# Patient Record
Sex: FEMALE | Race: WHITE | NOT HISPANIC OR LATINO | Employment: OTHER | URBAN - NONMETROPOLITAN AREA
[De-identification: names, ages, dates, MRNs, and addresses within clinical notes are randomized per-mention and may not be internally consistent; named-entity substitution may affect disease eponyms.]

---

## 2024-02-22 ENCOUNTER — TRANSCRIBE ORDERS (OUTPATIENT)
Dept: ADMINISTRATIVE | Facility: HOSPITAL | Age: 79
End: 2024-02-22
Payer: MEDICARE

## 2024-02-22 DIAGNOSIS — R01.1 HEART MURMUR: Primary | ICD-10-CM

## 2024-03-12 ENCOUNTER — HOSPITAL ENCOUNTER (OUTPATIENT)
Dept: CARDIOLOGY | Facility: HOSPITAL | Age: 79
Discharge: HOME | End: 2024-03-12
Admitting: INTERNAL MEDICINE
Payer: MEDICARE

## 2024-03-12 VITALS
HEIGHT: 66 IN | WEIGHT: 149 LBS | BODY MASS INDEX: 23.95 KG/M2 | SYSTOLIC BLOOD PRESSURE: 146 MMHG | DIASTOLIC BLOOD PRESSURE: 52 MMHG

## 2024-03-12 DIAGNOSIS — R01.1 HEART MURMUR: ICD-10-CM

## 2024-03-12 PROCEDURE — 25510000001 PERFLUTREN 6.52 MG/ML SUSPENSION: Performed by: INTERNAL MEDICINE

## 2024-03-12 PROCEDURE — 93306 TTE W/DOPPLER COMPLETE: CPT | Performed by: EMERGENCY MEDICINE

## 2024-03-12 PROCEDURE — 93306 TTE W/DOPPLER COMPLETE: CPT

## 2024-03-12 RX ADMIN — PERFLUTREN 9.78 MG: 6.52 INJECTION, SUSPENSION INTRAVENOUS at 16:12

## 2024-03-13 LAB
BH CV ECHO MEAS - AO MAX PG: 8.1 MMHG
BH CV ECHO MEAS - AO MEAN PG: 5 MMHG
BH CV ECHO MEAS - AO ROOT DIAM: 2.9 CM
BH CV ECHO MEAS - AO V2 MAX: 142 CM/SEC
BH CV ECHO MEAS - AO V2 VTI: 27.8 CM
BH CV ECHO MEAS - AVA(I,D): 2.26 CM2
BH CV ECHO MEAS - EDV(CUBED): 110.6 ML
BH CV ECHO MEAS - EDV(MOD-SP2): 148 ML
BH CV ECHO MEAS - EDV(MOD-SP4): 120 ML
BH CV ECHO MEAS - EF(MOD-BP): 40.5 %
BH CV ECHO MEAS - EF(MOD-SP2): 44.2 %
BH CV ECHO MEAS - EF(MOD-SP4): 36.3 %
BH CV ECHO MEAS - ESV(CUBED): 59.3 ML
BH CV ECHO MEAS - ESV(MOD-SP2): 82.6 ML
BH CV ECHO MEAS - ESV(MOD-SP4): 76.4 ML
BH CV ECHO MEAS - FS: 18.8 %
BH CV ECHO MEAS - IVS/LVPW: 1.09 CM
BH CV ECHO MEAS - IVSD: 1.2 CM
BH CV ECHO MEAS - LA DIMENSION: 4.2 CM
BH CV ECHO MEAS - LAT PEAK E' VEL: 9.1 CM/SEC
BH CV ECHO MEAS - LV DIASTOLIC VOL/BSA (35-75): 68 CM2
BH CV ECHO MEAS - LV MASS(C)D: 206.4 GRAMS
BH CV ECHO MEAS - LV MAX PG: 3.1 MMHG
BH CV ECHO MEAS - LV MEAN PG: 2 MMHG
BH CV ECHO MEAS - LV SYSTOLIC VOL/BSA (12-30): 43.3 CM2
BH CV ECHO MEAS - LV V1 MAX: 88.6 CM/SEC
BH CV ECHO MEAS - LV V1 VTI: 18.1 CM
BH CV ECHO MEAS - LVIDD: 4.8 CM
BH CV ECHO MEAS - LVIDS: 3.9 CM
BH CV ECHO MEAS - LVOT AREA: 3.5 CM2
BH CV ECHO MEAS - LVOT DIAM: 2.1 CM
BH CV ECHO MEAS - LVPWD: 1.1 CM
BH CV ECHO MEAS - MED PEAK E' VEL: 4.5 CM/SEC
BH CV ECHO MEAS - MR MAX PG: 114.1 MMHG
BH CV ECHO MEAS - MR MAX VEL: 534 CM/SEC
BH CV ECHO MEAS - MR MEAN PG: 69 MMHG
BH CV ECHO MEAS - MR MEAN VEL: 389 CM/SEC
BH CV ECHO MEAS - MR VTI: 186.5 CM
BH CV ECHO MEAS - MV A MAX VEL: 87 CM/SEC
BH CV ECHO MEAS - MV DEC TIME: 0.06 SEC
BH CV ECHO MEAS - MV E MAX VEL: 131 CM/SEC
BH CV ECHO MEAS - MV E/A: 1.51
BH CV ECHO MEAS - RAP SYSTOLE: 5 MMHG
BH CV ECHO MEAS - RVSP: 20.7 MMHG
BH CV ECHO MEAS - SI(MOD-SP2): 37.1 ML/M2
BH CV ECHO MEAS - SI(MOD-SP4): 24.7 ML/M2
BH CV ECHO MEAS - SV(LVOT): 62.7 ML
BH CV ECHO MEAS - SV(MOD-SP2): 65.4 ML
BH CV ECHO MEAS - SV(MOD-SP4): 43.6 ML
BH CV ECHO MEAS - TR MAX PG: 15.7 MMHG
BH CV ECHO MEAS - TR MAX VEL: 198 CM/SEC
BH CV ECHO MEASUREMENTS AVERAGE E/E' RATIO: 19.26
BH CV XLRA - RV BASE: 2.5 CM
BH CV XLRA - RV LENGTH: 5.7 CM
BH CV XLRA - RV MID: 2 CM
LEFT ATRIUM VOLUME INDEX: 34.3 ML/M2
LEFT ATRIUM VOLUME: 60.4 ML

## 2024-03-25 ENCOUNTER — TELEPHONE (OUTPATIENT)
Dept: INTERNAL MEDICINE | Facility: CLINIC | Age: 79
End: 2024-03-25
Payer: MEDICARE

## 2024-03-25 NOTE — TELEPHONE ENCOUNTER
CALLED PT, SON,  AND PT DAUGHTER TO RESCHEDULE APPT AND LVM ON DAUGHTER'S AND SON'S PHONE TO CALL US BACK

## 2024-04-04 ENCOUNTER — PATIENT ROUNDING (BHMG ONLY) (OUTPATIENT)
Dept: INTERNAL MEDICINE | Facility: CLINIC | Age: 79
End: 2024-04-04

## 2024-04-04 ENCOUNTER — OFFICE VISIT (OUTPATIENT)
Dept: INTERNAL MEDICINE | Facility: CLINIC | Age: 79
End: 2024-04-04
Payer: MEDICARE

## 2024-04-04 VITALS
OXYGEN SATURATION: 99 % | SYSTOLIC BLOOD PRESSURE: 155 MMHG | RESPIRATION RATE: 16 BRPM | TEMPERATURE: 98.5 F | WEIGHT: 148.2 LBS | HEIGHT: 66 IN | HEART RATE: 86 BPM | DIASTOLIC BLOOD PRESSURE: 74 MMHG | BODY MASS INDEX: 23.82 KG/M2

## 2024-04-04 DIAGNOSIS — L84 PRE-ULCERATIVE CORN OR CALLOUS: Primary | ICD-10-CM

## 2024-04-04 PROCEDURE — 1160F RVW MEDS BY RX/DR IN RCRD: CPT | Performed by: NURSE PRACTITIONER

## 2024-04-04 PROCEDURE — 1159F MED LIST DOCD IN RCRD: CPT | Performed by: NURSE PRACTITIONER

## 2024-04-04 PROCEDURE — 99203 OFFICE O/P NEW LOW 30 MIN: CPT | Performed by: NURSE PRACTITIONER

## 2024-04-04 RX ORDER — ASPIRIN 81 MG/1
TABLET ORAL
COMMUNITY

## 2024-04-04 RX ORDER — ATORVASTATIN CALCIUM 40 MG/1
40 TABLET, FILM COATED ORAL
COMMUNITY

## 2024-04-04 RX ORDER — DOXYCYCLINE HYCLATE 100 MG/1
100 CAPSULE ORAL 2 TIMES DAILY
Qty: 20 CAPSULE | Refills: 0 | Status: SHIPPED | OUTPATIENT
Start: 2024-04-04

## 2024-04-04 RX ORDER — ESTRADIOL 0.1 MG/G
CREAM VAGINAL
COMMUNITY

## 2024-04-04 RX ORDER — LOSARTAN POTASSIUM AND HYDROCHLOROTHIAZIDE 25; 100 MG/1; MG/1
1 TABLET ORAL DAILY
COMMUNITY

## 2024-04-04 RX ORDER — SACUBITRIL AND VALSARTAN 49; 51 MG/1; MG/1
TABLET, FILM COATED ORAL
COMMUNITY
Start: 2024-04-02

## 2024-04-04 RX ORDER — MELOXICAM 7.5 MG/1
1 TABLET ORAL DAILY
COMMUNITY

## 2024-04-04 RX ORDER — SPIRONOLACTONE 50 MG/1
1 TABLET, FILM COATED ORAL DAILY
COMMUNITY

## 2024-04-04 RX ORDER — INSULIN GLARGINE-YFGN 100 [IU]/ML
INJECTION, SOLUTION SUBCUTANEOUS
COMMUNITY

## 2024-04-04 RX ORDER — INSULIN ASPART 100 [IU]/ML
INJECTION, SOLUTION INTRAVENOUS; SUBCUTANEOUS
COMMUNITY

## 2024-04-04 NOTE — PROGRESS NOTES
April 4, 2024    Hello, may I speak with Yvrose Ingram?    My name is MARGARITO      I am  with [facility] [place]  [facility]  [address] [place] [address] [phone].    Before we get started may I verify your date of birth? 1945    I am calling to officially welcome you to our practice and ask about your recent visit. Is this a good time to talk? yes    Tell me about your visit with us. What things went well?  pt really liked Grace.  Pt stated she's so comfortable to talk to and funny.  The whole office is great, everyone is so nice.       We're always looking for ways to make our patients' experiences even better. Do you have recommendations on ways we may improve?  no    Overall were you satisfied with your first visit to our practice? yes       I appreciate you taking the time to speak with me today. Is there anything else I can do for you? no      Thank you, and have a great day.

## 2024-04-04 NOTE — PROGRESS NOTES
Subjective     Chief Complaint   Patient presents with    Skin Lesion     Left foot. Injury in November.        History of Present Illness  Patient comes in today as a new patient to me.  She is a previous provider of Dr. Rosa Maria Olivo.  Patient comes in today with complaints of a skin lesion on her left lateral foot.  Onset was lesion was December she noticed a black dot some skin came off she has been placing A&E and Polysporin on it.  Patient does carry history of diabetes, heart failure, and hypertension.  Last laboratory data was obtained in the end of February.  She tells me her last A1c was 6.9.  I do not have records at this time to corroborate.  The lesion does appear to have a suture.  She states that it was a black dot.  She states that she has soaked it and has used A& D ointment.     Review of Systems   Otherwise complete ROS reviewed and negative except as mentioned in the HPI.    Past Medical History:   Past Medical History:   Diagnosis Date    CHF (congestive heart failure)     Diabetes mellitus     Hyperlipidemia     Hypertension      Past Surgical History:History reviewed. No pertinent surgical history.  Social History:  reports that she has never smoked. She has never used smokeless tobacco. She reports that she does not drink alcohol and does not use drugs.    Family History: family history includes No Known Problems in her father and mother.       Allergies:  Allergies   Allergen Reactions    Penicillins Rash     Medications:  Prior to Admission medications    Medication Sig Start Date End Date Taking? Authorizing Provider   aspirin (Aspirin Adult Low Dose) 81 MG EC tablet Take 1 tablet every 3 weeks by oral route.   Yes Akash Rendon MD   atorvastatin (LIPITOR) 40 MG tablet Take 1 tablet by mouth every night at bedtime.   Yes Akash Rendon MD   Entresto 49-51 MG tablet  4/2/24  Yes Akash Rendon MD   estradiol (ESTRACE) 0.1 MG/GM vaginal cream INSERT 0.5 GRAMS  "3 TIMES A WEEK BY VAGINAL ROUTE AT BEDTIME.   Yes Akash Rendon MD   insulin aspart (novoLOG) 100 UNIT/ML injection PLEASE SEE ATTACHED FOR DETAILED DIRECTIONS   Yes Akash Rendon MD   losartan-hydrochlorothiazide (HYZAAR) 100-25 MG per tablet Take 1 tablet by mouth Daily.   Yes Akash Rendon MD   meloxicam (MOBIC) 7.5 MG tablet Take 1 tablet by mouth Daily.   Yes Akash Rendon MD   metFORMIN (GLUCOPHAGE) 1000 MG tablet Take 1 tablet by mouth 2 (Two) Times a Day.   Yes Provider, Historical, MD   Semglee, yfgn, 100 UNIT/ML solution pen-injector INJECT 18 UNITS EVERY MORNING   Yes Akash Rendon MD   spironolactone (ALDACTONE) 50 MG tablet Take 1 tablet by mouth Daily.   Yes Akash Rendon MD       Objective     Vital Signs: /74 (BP Location: Left arm, Patient Position: Sitting, Cuff Size: Adult)   Pulse 86   Temp 98.5 °F (36.9 °C) (Skin)   Resp 16   Ht 167.6 cm (65.98\")   Wt 67.2 kg (148 lb 3.2 oz)   SpO2 99%   BMI 23.93 kg/m²   Physical Exam  Vitals reviewed.   Constitutional:       Appearance: Normal appearance. She is well-developed.   HENT:      Head: Normocephalic and atraumatic.   Eyes:      Pupils: Pupils are equal, round, and reactive to light.   Neck:      Vascular: No JVD.   Cardiovascular:      Rate and Rhythm: Normal rate and regular rhythm.   Pulmonary:      Effort: Pulmonary effort is normal.      Breath sounds: Normal breath sounds.   Abdominal:      General: Bowel sounds are normal.      Palpations: Abdomen is soft.   Musculoskeletal:         General: No deformity.      Cervical back: Normal range of motion and neck supple.      Right lower leg: Edema present.      Left lower leg: Edema present.   Lymphadenopathy:      Cervical: No cervical adenopathy.   Skin:     General: Skin is warm and dry.      Comments: Callous with dark center left lateral edge. I shaved off some layers without any bleeding noted with #10 blade. Pt tolerated.  " "  Neurological:      Mental Status: She is alert and oriented to person, place, and time.   Psychiatric:         Behavior: Behavior normal.         Thought Content: Thought content normal.         Judgment: Judgment normal.         BMI is within normal parameters. No other follow-up for BMI required.      Results Reviewed:  No results found for: \"GLUCOSE\", \"BUN\", \"CREATININE\", \"NA\", \"K\", \"CL\", \"CO2\", \"CALCIUM\", \"ALT\", \"AST\", \"WBC\", \"HCT\", \"PLT\", \"CHOL\", \"TRIG\", \"HDL\", \"LDL\", \"LDLHDL\", \"HGBA1C\"      Assessment / Plan     Assessment/Plan:  Diagnoses and all orders for this visit:    1. Pre-ulcerative corn or callous (Primary)  -     mupirocin (BACTROBAN) 2 % ointment; Apply 1 Application topically to the appropriate area as directed 2 (Two) Times a Day.  Dispense: 22 g; Refill: 1  -     doxycycline (VIBRAMYCIN) 100 MG capsule; Take 1 capsule by mouth 2 (Two) Times a Day.  Dispense: 20 capsule; Refill: 0        Return in about 3 weeks (around 4/25/2024). unless patient needs to be seen sooner or acute issues arise.    Code Status: Full.     I have discussed the patient results/orders and and plan/recommendation with them at today's visit.      Signed by:    KARLA Roth Date: 04/04/24  "

## 2024-04-10 ENCOUNTER — NURSE TRIAGE (OUTPATIENT)
Dept: CALL CENTER | Facility: HOSPITAL | Age: 79
End: 2024-04-10
Payer: MEDICARE

## 2024-04-10 NOTE — TELEPHONE ENCOUNTER
"Reason for Disposition   Diastolic BP < 50 mm Hg    Additional Information   Negative: Started suddenly after an allergic medicine, an allergic food, or bee sting   Negative: Shock suspected (e.g., cold/pale/clammy skin, too weak to stand, low BP, rapid pulse)   Negative: Difficult to awaken or acting confused (e.g., disoriented, slurred speech)   Negative: Fainted   Negative: [1] Systolic BP < 90 AND [2] dizzy, lightheaded, or weak   Negative: Chest pain   Negative: Bleeding (e.g., vomiting blood, rectal bleeding or tarry stools, severe vaginal bleeding)(Exception: Fainted from sight of small amount of blood; small cut or abrasion.)   Negative: Extra heartbeats, irregular heart beating, or heart is beating very fast  (i.e., \"palpitations\")   Negative: Sounds like a life-threatening emergency to the triager   Negative: [1] Systolic BP < 80 AND [2] NOT dizzy, lightheaded or weak   Negative: Abdominal pain   Negative: Fever > 100.4 F (38.0 C)   Negative: Major surgery in the past month   Negative: [1] Drinking very little AND [2] dehydration suspected (e.g., no urine > 12 hours, very dry mouth, very lightheaded)   Negative: [1] Fall in systolic BP > 20 mm Hg from normal AND [2] dizzy, lightheaded, or weak   Negative: Patient sounds very sick or weak to the triager   Negative: [1] Systolic BP < 90 AND [2] NOT dizzy, lightheaded or weak   Negative: [1] Systolic BP  AND [2] taking blood pressure medications AND [3] dizzy, lightheaded or weak   Negative: [1] Systolic BP  AND [2] taking blood pressure medications AND [3] NOT dizzy, lightheaded or weak   Negative: [1] Fall in systolic BP > 20 mm Hg from normal AND [2] NOT dizzy, lightheaded, or weak   Negative: Fall in systolic BP > 20 mmHg after standing up   Negative: Fall in systolic BP > 20 mmHg after eating a meal    Answer Assessment - Initial Assessment Questions  1. BLOOD PRESSURE: \"What is the blood pressure?\" \"Did you take at least two measurements 5 " "minutes apart?\"      111/47 then 110/49  2. ONSET: \"When did you take your blood pressure?\"      4 and 415  3. HOW: \"How did you obtain the blood pressure?\" (e.g., visiting nurse, automatic home BP monitor)      Automatic cuff  4. HISTORY: \"Do you have a history of low blood pressure?\" \"What is your blood pressure normally?\"      denies  5. MEDICINES: \"Are you taking any medications for blood pressure?\" If Yes, ask: \"Have they been changed recently?\"      Started entresto on 4/8,   6. PULSE RATE: \"Do you know what your pulse rate is?\"       97 and 93  7. OTHER SYMPTOMS: \"Have you been sick recently?\" \"Have you had a recent injury?\"      denies  8. PREGNANCY: \"Is there any chance you are pregnant?\" \"When was your last menstrual period?\"      na    Protocols used: Blood Pressure - Low-ADULT-AH    "

## 2024-04-11 NOTE — TELEPHONE ENCOUNTER
I called patient and she stated that her PCP started her on it. I asked who that was and she stated Rosa Maria Olivo. I informed patient that she would need to call that office and patient stated she was going to as soon as they opened. I updated patient PCP.

## 2024-04-11 NOTE — TELEPHONE ENCOUNTER
Called patient back and she stated that Dr. Oilvo told her to cut spirolactone in half to 25 mg. Dr. Olivo gave patient a referral to cardiology to see Dr. Quiñones at [facility] this week. She is going to have Dr. Quiñones send all info to you so that she can see you as PCP.

## 2024-04-25 ENCOUNTER — OFFICE VISIT (OUTPATIENT)
Dept: INTERNAL MEDICINE | Facility: CLINIC | Age: 79
End: 2024-04-25
Payer: MEDICARE

## 2024-04-25 VITALS
TEMPERATURE: 97.5 F | OXYGEN SATURATION: 100 % | WEIGHT: 147 LBS | DIASTOLIC BLOOD PRESSURE: 70 MMHG | HEART RATE: 87 BPM | SYSTOLIC BLOOD PRESSURE: 129 MMHG | HEIGHT: 66 IN | RESPIRATION RATE: 16 BRPM | BODY MASS INDEX: 23.63 KG/M2

## 2024-04-25 DIAGNOSIS — E11.69 TYPE 2 DIABETES MELLITUS WITH OTHER SPECIFIED COMPLICATION, WITH LONG-TERM CURRENT USE OF INSULIN: Primary | ICD-10-CM

## 2024-04-25 DIAGNOSIS — E78.5 HYPERLIPIDEMIA, UNSPECIFIED HYPERLIPIDEMIA TYPE: ICD-10-CM

## 2024-04-25 DIAGNOSIS — Z79.4 TYPE 2 DIABETES MELLITUS WITH OTHER SPECIFIED COMPLICATION, WITH LONG-TERM CURRENT USE OF INSULIN: Primary | ICD-10-CM

## 2024-04-25 DIAGNOSIS — Z11.59 ENCOUNTER FOR HEPATITIS C SCREENING TEST FOR LOW RISK PATIENT: ICD-10-CM

## 2024-04-25 DIAGNOSIS — I10 PRIMARY HYPERTENSION: ICD-10-CM

## 2024-04-25 DIAGNOSIS — L84 PRE-ULCERATIVE CORN OR CALLOUS: ICD-10-CM

## 2024-04-25 PROCEDURE — G2211 COMPLEX E/M VISIT ADD ON: HCPCS | Performed by: NURSE PRACTITIONER

## 2024-04-25 PROCEDURE — 99214 OFFICE O/P EST MOD 30 MIN: CPT | Performed by: NURSE PRACTITIONER

## 2024-04-25 NOTE — PROGRESS NOTES
Subjective     Chief Complaint   Patient presents with    Callouses       History of Present Illness    The patient presents for evaluation of multiple medical concerns.    The patient has a longstanding history of foot swelling, which has recently intensified. Despite the swelling, her respiratory function remains unaffected, with no reported cough or dyspnea. Her last consultation with her cardiologist, Dr. Quiñones, was on 04/16/2024. A nuclear stress test is scheduled for approximately one week from now. The patient spends a significant amount of time seated, which she believes contributes to her foot swelling. She has attempted to use compression socks, but notes they are difficult to put on.     The patient's foot condition has improved, with a reduction in redness and a detached piece of skin two days prior. She continues to apply ointment to the affected area. Today, she has for the first time been able to wear shoes due to persistent soreness. An x-ray was conducted after an incident where she stepped on a , which did not reveal any abnormalities, although a bone spur was mentioned. She recently underwent toenail trimming.     Her current medication regimen includes half a tablet of Entresto twice daily and half a tablet of meloxicam. She has discontinued the use of spironolactone and losartan. She still experiences edema however. No orthopnea.       Review of Systems   Constitutional:  Negative for activity change, appetite change, chills and fever.   HENT:  Negative for hearing loss, nosebleeds, tinnitus and trouble swallowing.    Eyes:  Negative for visual disturbance.   Respiratory:  Negative for cough, chest tightness, shortness of breath and wheezing.    Cardiovascular:  Positive for leg swelling. Negative for chest pain and palpitations.   Gastrointestinal:  Negative for abdominal distention, abdominal pain, blood in stool, constipation, diarrhea, nausea and vomiting.   Endocrine: Negative  for cold intolerance, heat intolerance, polydipsia, polyphagia and polyuria.   Genitourinary:  Negative for decreased urine volume, difficulty urinating, dysuria, flank pain, frequency and hematuria.   Musculoskeletal:  Negative for arthralgias, joint swelling and myalgias.   Skin:  Negative for rash.   Allergic/Immunologic: Negative for immunocompromised state.   Neurological:  Negative for dizziness, syncope, weakness, light-headedness and headaches.   Hematological:  Negative for adenopathy. Does not bruise/bleed easily.   Psychiatric/Behavioral:  Negative for confusion and sleep disturbance. The patient is not nervous/anxious.         Otherwise complete ROS reviewed and negative except as mentioned in the HPI.    Past Medical History:   Past Medical History:   Diagnosis Date    CHF (congestive heart failure)     Diabetes mellitus     Hyperlipidemia     Hypertension      Past Surgical History:No past surgical history on file.  Social History:  reports that she has never smoked. She has never used smokeless tobacco. She reports that she does not drink alcohol and does not use drugs.    Family History: family history includes No Known Problems in her father and mother.       Allergies:  Allergies   Allergen Reactions    Penicillins Rash     Medications:  Prior to Admission medications    Medication Sig Start Date End Date Taking? Authorizing Provider   aspirin (Aspirin Adult Low Dose) 81 MG EC tablet Take 1 tablet every 3 weeks by oral route.    Akash Rendon MD   atorvastatin (LIPITOR) 40 MG tablet Take 1 tablet by mouth every night at bedtime.    Akash Rendon MD   doxycycline (VIBRAMYCIN) 100 MG capsule Take 1 capsule by mouth 2 (Two) Times a Day. 4/4/24   Zuleyka Sagastume APRN   Entresto 49-51 MG tablet  4/2/24   Akash Rendon MD   estradiol (ESTRACE) 0.1 MG/GM vaginal cream INSERT 0.5 GRAMS 3 TIMES A WEEK BY VAGINAL ROUTE AT BEDTIME.    Akash Rendon MD   insulin aspart  "(novoLOG) 100 UNIT/ML injection PLEASE SEE ATTACHED FOR DETAILED DIRECTIONS    Akash Rendon MD   losartan-hydrochlorothiazide (HYZAAR) 100-25 MG per tablet Take 1 tablet by mouth Daily.    Akash Rendon MD   meloxicam (MOBIC) 7.5 MG tablet Take 1 tablet by mouth Daily.    Akash Rendon MD   metFORMIN (GLUCOPHAGE) 1000 MG tablet Take 1 tablet by mouth 2 (Two) Times a Day.    Akash Rendon MD   mupirocin (BACTROBAN) 2 % ointment Apply 1 Application topically to the appropriate area as directed 2 (Two) Times a Day. 4/4/24   Zuleyka Sagastume, APRN   Semglee, yfgn, 100 UNIT/ML solution pen-injector INJECT 18 UNITS EVERY MORNING    Akash Rendon MD   spironolactone (ALDACTONE) 50 MG tablet Take 1 tablet by mouth Daily.    Akash Rendon MD       Objective     Vital Signs: /70   Pulse 87   Temp 97.5 °F (36.4 °C)   Resp 16   Ht 167.6 cm (65.98\")   Wt 66.7 kg (147 lb)   SpO2 100%   BMI 23.74 kg/m²   Physical Exam  HENT:      Head: Normocephalic and atraumatic.   Eyes:      Conjunctiva/sclera: Conjunctivae normal.      Pupils: Pupils are equal, round, and reactive to light.   Cardiovascular:      Rate and Rhythm: Normal rate and regular rhythm.      Heart sounds: Normal heart sounds.   Pulmonary:      Effort: Pulmonary effort is normal. No respiratory distress.      Comments: Diminished in bases bilaterally. No crackles.   Abdominal:      General: Bowel sounds are normal. There is no distension.      Palpations: Abdomen is soft.      Tenderness: There is no abdominal tenderness.   Musculoskeletal:         General: Swelling (1-2+pitting edema.) present.      Cervical back: Neck supple.   Skin:     General: Skin is warm and dry.      Findings: No rash.      Comments: Foot with softened callous, but remains tender.    Neurological:      General: No focal deficit present.      Mental Status: She is alert and oriented to person, place, and time.   Psychiatric:    " "     Mood and Affect: Mood normal.         Behavior: Behavior normal.         Thought Content: Thought content normal.         Judgment: Judgment normal.     BMI is within normal parameters. No other follow-up for BMI required.      Results Reviewed:  No results found for: \"GLUCOSE\", \"BUN\", \"CREATININE\", \"NA\", \"K\", \"CL\", \"CO2\", \"CALCIUM\", \"ALT\", \"AST\", \"WBC\", \"HCT\", \"PLT\", \"CHOL\", \"TRIG\", \"HDL\", \"LDL\", \"LDLHDL\", \"HGBA1C\"      Assessment / Plan     Assessment/Plan:  Diagnoses and all orders for this visit:    1. Type 2 diabetes mellitus with other specified complication, with long-term current use of insulin (Primary)  -     CBC & Differential  -     Comprehensive Metabolic Panel  -     Hemoglobin A1c  -     Lipid Panel    2. Primary hypertension  -     CBC & Differential  -     Comprehensive Metabolic Panel  -     Hemoglobin A1c  -     Lipid Panel    3. Hyperlipidemia, unspecified hyperlipidemia type  -     CBC & Differential  -     Comprehensive Metabolic Panel  -     Hemoglobin A1c  -     Lipid Panel    4. Encounter for hepatitis C screening test for low risk patient  -     Hepatitis C antibody    1. Lower extremity edema  A referral to podiatry will be made for further evaluation and management. She was encouraged to wear compression stocking during the day as tolerated.  Elevation of her legs during periods of rest is recommended. A daily sodium intake of 2000 mg or less is suggested.    Her echocardiogram results indicate a cardiac ejection fraction of 36 to 40 percent. Hypokinetic segments have been identified as potential blockages. Laboratory tests will be conducted today to assess renal function and electrolyte levels. Prescriptions for Entresto and meloxicam will be refilled.         Return for Please Schedule Medicare Wellness for end of May. unless patient needs to be seen sooner or acute issues arise.    Code Status: Full.     I have discussed the patient results/orders and and plan/recommendation with " them at today's visit.      Signed by:    KARLA Roth Date: 04/25/24  Transcribed from ambient dictation for KARLA Roth by Kelly Herndon.  04/25/24   15:11 CDT    Patient or patient representative verbalized consent to the visit recording.  I have personally performed the services described in this document as transcribed by the above individual, and it is both accurate and complete.  KARLA Roth  5/2/2024  08:11 CDT

## 2024-04-27 LAB
ALBUMIN SERPL-MCNC: 3.9 G/DL (ref 3.8–4.8)
ALBUMIN/GLOB SERPL: 1.6 {RATIO} (ref 1.2–2.2)
ALP SERPL-CCNC: 73 IU/L (ref 44–121)
ALT SERPL-CCNC: 23 IU/L (ref 0–32)
AST SERPL-CCNC: 25 IU/L (ref 0–40)
BASOPHILS # BLD AUTO: 0 X10E3/UL (ref 0–0.2)
BASOPHILS NFR BLD AUTO: 0 %
BILIRUB SERPL-MCNC: <0.2 MG/DL (ref 0–1.2)
BUN SERPL-MCNC: 29 MG/DL (ref 8–27)
BUN/CREAT SERPL: 33 (ref 12–28)
CALCIUM SERPL-MCNC: 9.4 MG/DL (ref 8.7–10.3)
CHLORIDE SERPL-SCNC: 105 MMOL/L (ref 96–106)
CHOLEST SERPL-MCNC: 127 MG/DL (ref 100–199)
CO2 SERPL-SCNC: 24 MMOL/L (ref 20–29)
CREAT SERPL-MCNC: 0.87 MG/DL (ref 0.57–1)
EGFRCR SERPLBLD CKD-EPI 2021: 69 ML/MIN/1.73
EOSINOPHIL # BLD AUTO: 0.2 X10E3/UL (ref 0–0.4)
EOSINOPHIL NFR BLD AUTO: 3 %
ERYTHROCYTE [DISTWIDTH] IN BLOOD BY AUTOMATED COUNT: 12.3 % (ref 11.7–15.4)
GLOBULIN SER CALC-MCNC: 2.4 G/DL (ref 1.5–4.5)
GLUCOSE SERPL-MCNC: 161 MG/DL (ref 70–99)
HBA1C MFR BLD: 7 % (ref 4.8–5.6)
HCT VFR BLD AUTO: 34.7 % (ref 34–46.6)
HCV IGG SERPL QL IA: NON REACTIVE
HDLC SERPL-MCNC: 61 MG/DL
HGB BLD-MCNC: 10.4 G/DL (ref 11.1–15.9)
IMM GRANULOCYTES # BLD AUTO: 0 X10E3/UL (ref 0–0.1)
IMM GRANULOCYTES NFR BLD AUTO: 0 %
LDLC SERPL CALC-MCNC: 48 MG/DL (ref 0–99)
LYMPHOCYTES # BLD AUTO: 1.6 X10E3/UL (ref 0.7–3.1)
LYMPHOCYTES NFR BLD AUTO: 28 %
MCH RBC QN AUTO: 27.7 PG (ref 26.6–33)
MCHC RBC AUTO-ENTMCNC: 30 G/DL (ref 31.5–35.7)
MCV RBC AUTO: 93 FL (ref 79–97)
MONOCYTES # BLD AUTO: 0.5 X10E3/UL (ref 0.1–0.9)
MONOCYTES NFR BLD AUTO: 8 %
NEUTROPHILS # BLD AUTO: 3.4 X10E3/UL (ref 1.4–7)
NEUTROPHILS NFR BLD AUTO: 61 %
PLATELET # BLD AUTO: 212 X10E3/UL (ref 150–450)
POTASSIUM SERPL-SCNC: 5.2 MMOL/L (ref 3.5–5.2)
PROT SERPL-MCNC: 6.3 G/DL (ref 6–8.5)
RBC # BLD AUTO: 3.75 X10E6/UL (ref 3.77–5.28)
SODIUM SERPL-SCNC: 142 MMOL/L (ref 134–144)
TRIGL SERPL-MCNC: 95 MG/DL (ref 0–149)
VLDLC SERPL CALC-MCNC: 18 MG/DL (ref 5–40)
WBC # BLD AUTO: 5.7 X10E3/UL (ref 3.4–10.8)

## 2024-05-02 LAB
IRON SATN MFR SERPL: 17 % (ref 15–55)
IRON SERPL-MCNC: 52 UG/DL (ref 27–139)
TIBC SERPL-MCNC: 309 UG/DL (ref 250–450)
UIBC SERPL-MCNC: 257 UG/DL (ref 118–369)
VIT B12 SERPL-MCNC: 844 PG/ML (ref 232–1245)
WRITTEN AUTHORIZATION: NORMAL

## 2024-05-09 ENCOUNTER — TELEPHONE (OUTPATIENT)
Dept: VASCULAR SURGERY | Facility: CLINIC | Age: 79
End: 2024-05-09
Payer: MEDICARE

## 2024-05-15 ENCOUNTER — TELEPHONE (OUTPATIENT)
Dept: INTERNAL MEDICINE | Facility: CLINIC | Age: 79
End: 2024-05-15

## 2024-05-15 NOTE — TELEPHONE ENCOUNTER
Name: Juan Jose Yvrose R    Relationship: Self    Best Callback Number:      [phone]       HUB PROVIDED THE RELAY MESSAGE FROM THE OFFICE   PATIENT VOICED UNDERSTANDING AND HAS NO FURTHER QUESTIONS AT THIS TIME    ADDITIONAL INFORMATION:

## 2024-05-21 ENCOUNTER — OFFICE VISIT (OUTPATIENT)
Dept: INTERNAL MEDICINE | Facility: CLINIC | Age: 79
End: 2024-05-21
Payer: MEDICARE

## 2024-05-21 VITALS
HEIGHT: 66 IN | HEART RATE: 85 BPM | SYSTOLIC BLOOD PRESSURE: 136 MMHG | RESPIRATION RATE: 16 BRPM | TEMPERATURE: 97.5 F | BODY MASS INDEX: 23.95 KG/M2 | WEIGHT: 149 LBS | DIASTOLIC BLOOD PRESSURE: 78 MMHG | OXYGEN SATURATION: 97 %

## 2024-05-21 DIAGNOSIS — Z78.0 POST-MENOPAUSE: ICD-10-CM

## 2024-05-21 DIAGNOSIS — Z79.4 TYPE 2 DIABETES MELLITUS WITH OTHER SPECIFIED COMPLICATION, WITH LONG-TERM CURRENT USE OF INSULIN: ICD-10-CM

## 2024-05-21 DIAGNOSIS — E11.69 TYPE 2 DIABETES MELLITUS WITH OTHER SPECIFIED COMPLICATION, WITH LONG-TERM CURRENT USE OF INSULIN: ICD-10-CM

## 2024-05-21 DIAGNOSIS — M25.552 CHRONIC LEFT HIP PAIN: ICD-10-CM

## 2024-05-21 DIAGNOSIS — Z00.00 MEDICARE ANNUAL WELLNESS VISIT, SUBSEQUENT: Primary | ICD-10-CM

## 2024-05-21 DIAGNOSIS — G89.29 CHRONIC LEFT HIP PAIN: ICD-10-CM

## 2024-05-21 RX ORDER — PREDNISONE 20 MG/1
TABLET ORAL
Qty: 7 TABLET | Refills: 0 | Status: SHIPPED | OUTPATIENT
Start: 2024-05-21

## 2024-05-21 NOTE — PROGRESS NOTES
The ABCs of the Annual Wellness Visit  Subsequent Medicare Wellness Visit    Subjective    Yvrose Ingram is a 78 y.o. female who presents for a Subsequent Medicare Wellness Visit.    The following portions of the patient's history were reviewed and   updated as appropriate: allergies, current medications, past family history, past medical history, past social history, past surgical history, and problem list.    Compared to one year ago, the patient feels her physical   health is the same.    Compared to one year ago, the patient feels her mental   health is the same.    Recent Hospitalizations:  She was not admitted to the hospital during the last year.       Current Medical Providers:  Patient Care Team:  Zuleyka Sagastume APRN as PCP - General (Nurse Practitioner)    Outpatient Medications Prior to Visit   Medication Sig Dispense Refill    aspirin (Aspirin Adult Low Dose) 81 MG EC tablet Take 1 tablet every 3 weeks by oral route.      atorvastatin (LIPITOR) 40 MG tablet Take 1 tablet by mouth every night at bedtime.      doxycycline (VIBRAMYCIN) 100 MG capsule Take 1 capsule by mouth 2 (Two) Times a Day. 20 capsule 0    Entresto 49-51 MG tablet       estradiol (ESTRACE) 0.1 MG/GM vaginal cream INSERT 0.5 GRAMS 3 TIMES A WEEK BY VAGINAL ROUTE AT BEDTIME.      insulin aspart (novoLOG) 100 UNIT/ML injection PLEASE SEE ATTACHED FOR DETAILED DIRECTIONS      losartan-hydrochlorothiazide (HYZAAR) 100-25 MG per tablet Take 1 tablet by mouth Daily.      meloxicam (MOBIC) 7.5 MG tablet Take 1 tablet by mouth Daily.      metFORMIN (GLUCOPHAGE) 1000 MG tablet Take 1 tablet by mouth 2 (Two) Times a Day.      mupirocin (BACTROBAN) 2 % ointment Apply 1 Application topically to the appropriate area as directed 2 (Two) Times a Day. 22 g 1    Semglee, yfgn, 100 UNIT/ML solution pen-injector INJECT 18 UNITS EVERY MORNING      spironolactone (ALDACTONE) 50 MG tablet Take 1 tablet by mouth Daily.       No facility-administered  "medications prior to visit.       No opioid medication identified on active medication list. I have reviewed chart for other potential  high risk medication/s and harmful drug interactions in the elderly.        Aspirin is on active medication list. Aspirin use is indicated based on review of current medical condition/s. Pros and cons of this therapy have been discussed today. Benefits of this medication outweigh potential harm.  Patient has been encouraged to continue taking this medication.  .      There is no problem list on file for this patient.    Advance Care Planning   Advance Care Planning     Advance Directive is not on file.  ACP discussion was held with the patient during this visit. Patient does not have an advance directive, information provided.     Objective    Vitals:    24 1004   BP: 136/78   Pulse: 85   Resp: 16   Temp: 97.5 °F (36.4 °C)   SpO2: 97%   Weight: 67.6 kg (149 lb)   Height: 167.6 cm (65.98\")     Estimated body mass index is 24.06 kg/m² as calculated from the following:    Height as of this encounter: 167.6 cm (65.98\").    Weight as of this encounter: 67.6 kg (149 lb).    BMI is within normal parameters. No other follow-up for BMI required.      Does the patient have evidence of cognitive impairment? No    Lab Results   Component Value Date    CHLPL 127 2024    TRIG 95 2024    HDL 61 2024    LDL 48 2024    VLDL 18 2024    HGBA1C 7.0 (H) 2024        HEALTH RISK ASSESSMENT    Smoking Status:  Social History     Tobacco Use   Smoking Status Never   Smokeless Tobacco Never     Alcohol Consumption:  Social History     Substance and Sexual Activity   Alcohol Use Never     Fall Risk Screen:    STEADI Fall Risk Assessment was completed, and patient is at LOW risk for falls.Assessment completed on:2024    Depression Screenin/21/2024     9:05 AM   PHQ-2/PHQ-9 Depression Screening   Little Interest or Pleasure in Doing Things 0-->not at all "   Feeling Down, Depressed or Hopeless 0-->not at all   PHQ-9: Brief Depression Severity Measure Score 0       Health Habits and Functional and Cognitive Screenin/21/2024     9:04 AM   Functional & Cognitive Status   Do you have difficulty preparing food and eating? No   Do you have difficulty bathing yourself, getting dressed or grooming yourself? No   Do you have difficulty using the toilet? No   Do you have difficulty moving around from place to place? No   Do you have trouble with steps or getting out of a bed or a chair? No   Current Diet Well Balanced Diet   Dental Exam Other   Eye Exam Up to date   Exercise (times per week) Other   Current Exercises Include Other   Do you need help using the phone?  No   Are you deaf or do you have serious difficulty hearing?  No   Do you need help to go to places out of walking distance? No   Do you need help shopping? No   Do you need help preparing meals?  No   Do you need help with housework?  No   Do you need help with laundry? No   Do you need help taking your medications? No   Do you need help managing money? No   Do you ever drive or ride in a car without wearing a seat belt? No   Have you felt unusual stress, anger or loneliness in the last month? No   Who do you live with? Other   If you need help, do you have trouble finding someone available to you? No   Have you been bothered in the last four weeks by sexual problems? No   Do you have difficulty concentrating, remembering or making decisions? No       Age-appropriate Screening Schedule:  Refer to the list below for future screening recommendations based on patient's age, sex and/or medical conditions. Orders for these recommended tests are listed in the plan section. The patient has been provided with a written plan.    Health Maintenance   Topic Date Due    URINE MICROALBUMIN  Never done    DXA SCAN  Never done    DIABETIC EYE EXAM  Never done    TDAP/TD VACCINES (1 - Tdap) Never done    ZOSTER VACCINE  "(1 of 2) Never done    RSV Vaccine - Adults (1 - 1-dose 60+ series) Never done    COVID-19 Vaccine (4 - 2023-24 season) 05/23/2024 (Originally 9/1/2023)    Pneumococcal Vaccine 65+ (1 of 2 - PCV) 05/21/2025 (Originally 11/21/1951)    INFLUENZA VACCINE  08/01/2024    HEMOGLOBIN A1C  10/25/2024    LIPID PANEL  04/25/2025    ANNUAL WELLNESS VISIT  05/21/2025    HEPATITIS C SCREENING  Completed                  CMS Preventative Services Quick Reference  Risk Factors Identified During Encounter  Immunizations Discussed/Encouraged: Prevnar 20 (Pneumococcal 20-valent conjugate), Shingrix, and RSV (Respiratory Syncytial Virus)  The above risks/problems have been discussed with the patient.  Pertinent information has been shared with the patient in the After Visit Summary.  An After Visit Summary and PPPS were made available to the patient.    Follow Up:   Next Medicare Wellness visit to be scheduled in 1 year.       Additional E&M Note during same encounter follows:  Patient has multiple medical problems which are significant and separately identifiable that require additional work above and beyond the Medicare Wellness Visit.      Chief Complaint  Medicare Wellness-subsequent    Subjective        HPI  Yvrose Ingram is also being seen today for hip pain. Is recurrent. Happened about a year ago. Is mostly in the left hip. She took steroids and did physical therapy in the past and it helped. It is currently worse in the am. Has been putting Salon Paas on it with some improvement. She was unable to sleep last night because of it. Rates her pain at a 5 today in the office.     Feet are still swelling. She has an appointment with podiatry on 6/12. The callous looks worse. Is sore again.          Objective   Vital Signs:  /78   Pulse 85   Temp 97.5 °F (36.4 °C)   Resp 16   Ht 167.6 cm (65.98\")   Wt 67.6 kg (149 lb)   SpO2 97%   BMI 24.06 kg/m²     Physical Exam  Vitals reviewed.   Constitutional:       Appearance: " She is well-developed.   HENT:      Head: Normocephalic and atraumatic.   Eyes:      Pupils: Pupils are equal, round, and reactive to light.   Neck:      Vascular: No JVD.   Cardiovascular:      Rate and Rhythm: Normal rate and regular rhythm.   Pulmonary:      Effort: Pulmonary effort is normal.   Abdominal:      General: Bowel sounds are normal.      Palpations: Abdomen is soft.   Musculoskeletal:         General: Tenderness (left lateral upper hip and buttocks tenderness.) present. No deformity.      Cervical back: Normal range of motion and neck supple.   Lymphadenopathy:      Cervical: No cervical adenopathy.   Skin:     General: Skin is warm and dry.   Neurological:      Mental Status: She is alert and oriented to person, place, and time.   Psychiatric:         Behavior: Behavior normal.         Thought Content: Thought content normal.         Judgment: Judgment normal.          Assessment and Plan   Diagnoses and all orders for this visit:    1. Medicare annual wellness visit, subsequent (Primary)    2. Type 2 diabetes mellitus with other specified complication, with long-term current use of insulin  -     Microalbumin / Creatinine Urine Ratio - Urine, Clean Catch    3. Post-menopause  -     DEXA Bone Density Axial; Future    4. Chronic left hip pain  -     predniSONE (DELTASONE) 20 MG tablet; Take 2 pills daily X1, then 1 pill daily for 3 days, then 1/2 pill daily for 4 days then stop.  Dispense: 7 tablet; Refill: 0        - Pt is agreeable to restart her exercises.      Follow Up   Return in about 3 months (around 8/21/2024).  Patient was given instructions and counseling regarding her condition or for health maintenance advice. Please see specific information pulled into the AVS if appropriate.

## 2024-05-22 LAB
ALBUMIN/CREAT UR: 26 MG/G CREAT (ref 0–29)
CREAT UR-MCNC: 19.6 MG/DL
MICROALBUMIN UR-MCNC: 5 UG/ML

## 2024-06-05 NOTE — PROGRESS NOTES
[facility] - PODIATRY    Today's Date: 06/12/2024     Patient Name: Yvrose Ingram  MRN: [id number]  CSN: [id number]  PCP: Zuleyka Sagastume APRN  Referring Provider: Zuleyka Sagastume*    SUBJECTIVE     Chief Complaint   Patient presents with    Establish Care     Zuleyka Sagastume 05/21/2024 pre ulcerative callus/corn- pt states needs help with callus care- pt pain 10/10 at worst, not constant.     Diabetes     HPI: Yvrose Ingram, a 78 y.o.female, comes to clinic as a(n) new patient presenting for diabetic foot exam and complaining of toenail/callus issues. Patient has h/o CHF, type II DM, hyperlipidemia, hypertension . Patient is IDDM and unsure of last BG level. Last A1C was around 7.0%.  Today patient presents with complaints of elongated, thickened toenails.  Patient reports she was previously being seen by a podiatrist and very and was not pleased with her care.  Patient denies any numbness or tingling in her feet.  However she does note around this past November and 2023, she accidentally stepped on an ornament finger which left a sore to the lateral edge of her left foot.  Reports the area has never fully healed.  Notes that she often is able to pull off dead flaky skin.  Reports she does occasionally notice some drainage present and will apply a bandage to the area.  Was called in antibiotic ointment by her PCP to apply to the area.  Notes a moderate amount of tenderness if the area is touched.  Can only wear certain pair of shoes without having pain.  Does report a moderate amount of swelling to her bilateral lower extremities.  Does not regularly wear compression stockings.  However, does typically keep her feet propped up while at rest.  Admits pain at 10/10 level. Relates previous treatment(s) including care by podiatrist in [place] . Denies any constitutional symptoms. No other pedal complaints at this time.    Past Medical History:   Diagnosis Date    CHF (congestive  heart failure)     Diabetes mellitus     Hyperlipidemia     Hypertension      History reviewed. No pertinent surgical history.  Family History   Problem Relation Age of Onset    No Known Problems Mother     No Known Problems Father      Social History     Socioeconomic History    Marital status:    Tobacco Use    Smoking status: Never    Smokeless tobacco: Never   Vaping Use    Vaping status: Never Used   Substance and Sexual Activity    Alcohol use: Never    Drug use: Never    Sexual activity: Not Currently     Allergies   Allergen Reactions    Penicillins Rash     Current Outpatient Medications   Medication Sig Dispense Refill    aspirin (Aspirin Adult Low Dose) 81 MG EC tablet Take 1 tablet every 3 weeks by oral route.      atorvastatin (LIPITOR) 40 MG tablet Take 1 tablet by mouth every night at bedtime.      Entresto 49-51 MG tablet       estradiol (ESTRACE) 0.1 MG/GM vaginal cream INSERT 0.5 GRAMS 3 TIMES A WEEK BY VAGINAL ROUTE AT BEDTIME.      furosemide (LASIX) 20 MG tablet Take 1 tablet by mouth 2 (Two) Times a Day.      meloxicam (MOBIC) 7.5 MG tablet Take 1 tablet by mouth Daily.      metFORMIN (GLUCOPHAGE) 1000 MG tablet Take 1 tablet by mouth 2 (Two) Times a Day.      mupirocin (BACTROBAN) 2 % ointment Apply 1 Application topically to the appropriate area as directed 2 (Two) Times a Day. 22 g 1    Semglee, yfgn, 100 UNIT/ML solution pen-injector INJECT 18 UNITS EVERY MORNING       No current facility-administered medications for this visit.     Review of Systems   Constitutional:  Negative for activity change and fever.   HENT:  Negative for congestion.    Respiratory:  Negative for chest tightness and shortness of breath.    Cardiovascular:  Positive for leg swelling. Negative for chest pain.   Gastrointestinal:  Negative for abdominal pain, constipation, diarrhea, nausea and vomiting.   Musculoskeletal:  Positive for arthralgias. Negative for gait problem.        Foot pain.   Skin:  Positive  for color change and wound.        Wound to left foot.  Occasional drainage noted.  Thickened, elongated toenails   Neurological:  Negative for dizziness, weakness and numbness.   Psychiatric/Behavioral:  Negative for agitation, behavioral problems and confusion.        OBJECTIVE     Vitals:    24 1437   BP: 136/78   Pulse: 67   SpO2: 99%       PHYSICAL EXAM  GEN:   Accompanied by none.     Foot/Ankle Exam    GENERAL  Diabetic foot exam performed    Appearance:  appears stated age  Orientation:  AAOx3  Affect:  appropriate  Gait:  unimpaired  Assistance:  independent  Right shoe gear: sandal  Left shoe gear: sandal    VASCULAR     Right Foot Vascularity   Dorsalis pedis:  2+  Posterior tibial:  2+  Skin temperature:  warm  Edema gradin+ and pitting  CFT:  3  Pedal hair growth:  Present     Left Foot Vascularity   Dorsalis pedis:  2+  Posterior tibial:  2+  Skin temperature:  warm  Edema gradin+ and pitting  CFT:  3  Pedal hair growth:  Present     NEUROLOGIC     Right Foot Neurologic   Normal sensation    Light touch sensation: normal  Vibratory sensation: normal  Hot/Cold sensation: normal  Protective Sensation using [place]-Radha Monofilament:   Sites intact: 10  Sites tested: 10     Left Foot Neurologic   Normal sensation    Light touch sensation: normal  Vibratory sensation: normal  Hot/Cold sensation:  normal  Protective Sensation using [place]Radha Monofilament:   Sites intact: 10  Sites tested: 10    MUSCULOSKELETAL     Right Foot Musculoskeletal   Ecchymosis:  none  Tenderness:  none    Arch:  Normal     Left Foot Musculoskeletal   Ecchymosis:  none  Tenderness:  lateral foot tenderness  Arch:  Normal    MUSCLE STRENGTH     Right Foot Muscle Strength   Foot dorsiflexion:  5  Foot plantar flexion:  5  Foot inversion:  5  Foot eversion:  5     Left Foot Muscle Strength   Foot dorsiflexion:  5  Foot plantar flexion:  5  Foot inversion:  5  Foot eversion:  5    RANGE OF MOTION     Right  Foot Range of Motion   Foot and ankle ROM within normal limits       Left Foot Range of Motion   Foot and ankle ROM within normal limits      DERMATOLOGIC      Right Foot Dermatologic   Skin  Positive for atrophy.   Nails  1.  Positive for elongated and abnormal thickness.  2.  Positive for elongated and abnormal thickness.  3.  Positive for elongated and abnormal thickness.  4.  Positive for elongated and abnormal thickness.  5.  Positive for elongated and abnormal thickness.     Left Foot Dermatologic   Skin  Positive for corn, ulcer and atrophy.   Nails  1.  Positive for elongated and abnormal thickness.  2.  Positive for elongated and abnormal thickness.  3.  Positive for elongated and abnormal thickness.  4.  Positive for elongated and abnormally thick.  5.  Positive for elongated and abnormally thick.     Left foot additional comments: HPK overlying small ulceration to lateral edge of left foot.  I did pare down the callus over the top.  However patient did not want me to debride the wound.  Referral to wound care placed      RADIOLOGY/NUCLEAR:  No results found.    LABORATORY/CULTURE RESULTS:      PATHOLOGY RESULTS:       ASSESSMENT/PLAN     Diagnoses and all orders for this visit:    1. Thickened nails (Primary)    2. Left foot pain    3. Type 2 diabetes mellitus without complication, with long-term current use of insulin    4. Neuropathic ulcer of left foot, unspecified ulcer stage  -     Ambulatory Referral to Wound Clinic    5. Bilateral lower extremity edema    6. Encounter for diabetic foot exam      Comprehensive lower extremity examination and evaluation was performed.  Discussed findings and treatment plan including risks, benefits, and treatment options with patient in detail. Patient agreed with treatment plan.  After verbal consent obtained, nail(s) x10 debrided of length and thickness with nail nipper without incidence  After verbal consent obtained, calluses x1 pared utilizing dermal curette  and/or scalpel without incidence  Patient may maintain nails and calluses at home utilizing emery board or pumice stone between visits as needed  Reviewed at home diabetic foot care including daily foot checks   Referral to outpatient wound care center placed today for further evaluation and treatment of nonhealing ulceration to lateral edge of left foot.  Control and management of type II DM to be continued per PCP.  Patient interested in routine diabetic foot nail care services.  Next appointment scheduled for 3 months.    An After Visit Summary was printed and given to the patient at discharge, including (if requested) any available informative/educational handouts regarding diagnosis, treatment, or medications. All questions were answered to patient/family satisfaction. Should symptoms fail to improve or worsen they agree to call or return to clinic or to go to the Emergency Department. Discussed the importance of following up with any needed screening tests/labs/specialist appointments and any requested follow-up recommended by me today. Importance of maintaining follow-up discussed and patient accepts that missed appointments can delay diagnosis and potentially lead to worsening of conditions.  Return in about 3 months (around 9/12/2024) for Follow-up with KARLA, Follow[facility]up in [facility] or sooner if acute issues arise.      This document has been electronically signed by KARLA Gutierrez on June 12, 2024 16:55 CDT

## 2024-06-12 ENCOUNTER — OFFICE VISIT (OUTPATIENT)
Dept: PODIATRY | Facility: CLINIC | Age: 79
End: 2024-06-12
Payer: MEDICARE

## 2024-06-12 VITALS
BODY MASS INDEX: 23.95 KG/M2 | WEIGHT: 149 LBS | OXYGEN SATURATION: 99 % | DIASTOLIC BLOOD PRESSURE: 78 MMHG | SYSTOLIC BLOOD PRESSURE: 136 MMHG | HEART RATE: 67 BPM | HEIGHT: 66 IN

## 2024-06-12 DIAGNOSIS — Z79.4 TYPE 2 DIABETES MELLITUS WITHOUT COMPLICATION, WITH LONG-TERM CURRENT USE OF INSULIN: ICD-10-CM

## 2024-06-12 DIAGNOSIS — E11.9 ENCOUNTER FOR DIABETIC FOOT EXAM: ICD-10-CM

## 2024-06-12 DIAGNOSIS — M79.672 LEFT FOOT PAIN: ICD-10-CM

## 2024-06-12 DIAGNOSIS — L60.2 THICKENED NAILS: Primary | ICD-10-CM

## 2024-06-12 DIAGNOSIS — E11.9 TYPE 2 DIABETES MELLITUS WITHOUT COMPLICATION, WITH LONG-TERM CURRENT USE OF INSULIN: ICD-10-CM

## 2024-06-12 DIAGNOSIS — L97.529 NEUROPATHIC ULCER OF LEFT FOOT, UNSPECIFIED ULCER STAGE: ICD-10-CM

## 2024-06-12 DIAGNOSIS — R60.0 BILATERAL LOWER EXTREMITY EDEMA: ICD-10-CM

## 2024-06-12 RX ORDER — FUROSEMIDE 20 MG/1
20 TABLET ORAL 2 TIMES DAILY
COMMUNITY

## 2024-06-20 ENCOUNTER — TELEPHONE (OUTPATIENT)
Dept: INTERNAL MEDICINE | Facility: CLINIC | Age: 79
End: 2024-06-20

## 2024-06-20 ENCOUNTER — LAB REQUISITION (OUTPATIENT)
Dept: LAB | Facility: HOSPITAL | Age: 79
End: 2024-06-20
Payer: MEDICARE

## 2024-06-20 ENCOUNTER — OFFICE VISIT (OUTPATIENT)
Dept: WOUND CARE | Facility: HOSPITAL | Age: 79
End: 2024-06-20
Payer: MEDICARE

## 2024-06-20 DIAGNOSIS — L97.529 NON-PRESSURE CHRONIC ULCER OF OTHER PART OF LEFT FOOT WITH UNSPECIFIED SEVERITY: ICD-10-CM

## 2024-06-20 PROCEDURE — 87205 SMEAR GRAM STAIN: CPT

## 2024-06-20 PROCEDURE — G0463 HOSPITAL OUTPT CLINIC VISIT: HCPCS

## 2024-06-20 PROCEDURE — 87075 CULTR BACTERIA EXCEPT BLOOD: CPT

## 2024-06-20 PROCEDURE — 87070 CULTURE OTHR SPECIMN AEROBIC: CPT

## 2024-06-20 PROCEDURE — 87176 TISSUE HOMOGENIZATION CULTR: CPT

## 2024-06-20 NOTE — TELEPHONE ENCOUNTER
Caller: Yvrose Ingram    Relationship: Self    Best call back number: [phone]     What is the best time to reach you: ANYTIME    Who are you requesting to speak with (clinical staff, provider,  specific staff member): BONI CASTILLO    What was the call regarding: PATIENT STATED SHE HAS ORDERS FROM URBANO GONZALEZ FROM WOUND CARE FOR AN X-RAY OF PATIENT'S LEFT FOOT.    PATIENT STATED SHE WOULD LIKE TO SCHEDULE THIS APPOINTMENT IN OFFICE BONI CASTILLO    PLEASE CALL TO SCHEDULE.

## 2024-06-23 LAB
BACTERIA SPEC AEROBE CULT: NORMAL
BACTERIA SPEC ANAEROBE CULT: NORMAL
GRAM STN SPEC: NORMAL

## 2024-06-25 ENCOUNTER — TRANSCRIBE ORDERS (OUTPATIENT)
Dept: ADMINISTRATIVE | Facility: HOSPITAL | Age: 79
End: 2024-06-25
Payer: MEDICARE

## 2024-06-25 DIAGNOSIS — M86.9 OSTEOMYELITIS, UNSPECIFIED SITE, UNSPECIFIED TYPE: Primary | ICD-10-CM

## 2024-06-25 LAB — BACTERIA SPEC ANAEROBE CULT: NORMAL

## 2024-06-28 RX ORDER — SACUBITRIL AND VALSARTAN 49; 51 MG/1; MG/1
TABLET, FILM COATED ORAL
Qty: 60 TABLET | Refills: 1 | Status: SHIPPED | OUTPATIENT
Start: 2024-06-28

## 2024-07-06 ENCOUNTER — HOSPITAL ENCOUNTER (OUTPATIENT)
Dept: MRI IMAGING | Facility: HOSPITAL | Age: 79
Discharge: HOME | End: 2024-07-06
Payer: MEDICARE

## 2024-07-06 DIAGNOSIS — M86.9 OSTEOMYELITIS, UNSPECIFIED SITE, UNSPECIFIED TYPE: ICD-10-CM

## 2024-07-06 PROCEDURE — 73718 MRI LOWER EXTREMITY W/O DYE: CPT

## 2024-07-15 ENCOUNTER — OFFICE VISIT (OUTPATIENT)
Dept: INTERNAL MEDICINE | Facility: CLINIC | Age: 79
End: 2024-07-15
Payer: MEDICARE

## 2024-07-15 VITALS
RESPIRATION RATE: 16 BRPM | BODY MASS INDEX: 24.75 KG/M2 | TEMPERATURE: 98 F | HEIGHT: 66 IN | HEART RATE: 81 BPM | DIASTOLIC BLOOD PRESSURE: 71 MMHG | WEIGHT: 154 LBS | OXYGEN SATURATION: 98 % | SYSTOLIC BLOOD PRESSURE: 130 MMHG

## 2024-07-15 DIAGNOSIS — R60.0 LOWER EXTREMITY EDEMA: ICD-10-CM

## 2024-07-15 DIAGNOSIS — E11.69 TYPE 2 DIABETES MELLITUS WITH OTHER SPECIFIED COMPLICATION, WITH LONG-TERM CURRENT USE OF INSULIN: ICD-10-CM

## 2024-07-15 DIAGNOSIS — Z79.4 TYPE 2 DIABETES MELLITUS WITH OTHER SPECIFIED COMPLICATION, WITH LONG-TERM CURRENT USE OF INSULIN: ICD-10-CM

## 2024-07-15 DIAGNOSIS — L08.9 SOFT TISSUE INFECTION: Primary | ICD-10-CM

## 2024-07-15 PROCEDURE — 1160F RVW MEDS BY RX/DR IN RCRD: CPT

## 2024-07-15 PROCEDURE — 1159F MED LIST DOCD IN RCRD: CPT

## 2024-07-15 PROCEDURE — 99213 OFFICE O/P EST LOW 20 MIN: CPT

## 2024-07-15 RX ORDER — METOPROLOL SUCCINATE 25 MG/1
1 TABLET, EXTENDED RELEASE ORAL DAILY
COMMUNITY

## 2024-07-15 RX ORDER — CYANOCOBALAMIN (VITAMIN B-12) 1000 MCG
TABLET ORAL
COMMUNITY

## 2024-07-15 RX ORDER — DOXYCYCLINE HYCLATE 100 MG/1
100 CAPSULE ORAL 2 TIMES DAILY
Qty: 14 CAPSULE | Refills: 0 | Status: SHIPPED | OUTPATIENT
Start: 2024-07-15 | End: 2024-07-22

## 2024-07-15 RX ORDER — AZELASTINE HYDROCHLORIDE 137 UG/1
SPRAY, METERED NASAL
COMMUNITY
Start: 2024-06-28

## 2024-07-15 NOTE — PROGRESS NOTES
"Chief Complaint  Rash (Bilateral lower extremities, appeared this morning )    Subjective        Yvrose Ingram presents to [facility] PR[facility]  History of Present Illness  Patient is a 78-year-old female presenting today with complaints of redness to bilateral lower extremities. Noticed swelling in her left lower leg worsening about 2 days ago. This morning after taking her shower noticed a moderate area of redness to her left lower leg and a small area to the right lower leg. Describes having had a history of cellulitis with it beginning with the same appearance.     Past Medical History:   Diagnosis Date    CHF (congestive heart failure)     Diabetes mellitus     Hyperlipidemia     Hypertension      History reviewed. No pertinent surgical history.  Social History     Socioeconomic History    Marital status:    Tobacco Use    Smoking status: Never     Passive exposure: Never    Smokeless tobacco: Never   Vaping Use    Vaping status: Never Used   Substance and Sexual Activity    Alcohol use: Never    Drug use: Never    Sexual activity: Not Currently     Family History   Problem Relation Age of Onset    No Known Problems Mother     No Known Problems Father        Review of Systems  Review of systems is negative unless otherwise specified in HPI.    Objective   Vital Signs:  /71 (BP Location: Left arm, Patient Position: Sitting, Cuff Size: Adult)   Pulse 81   Temp 98 °F (36.7 °C) (Infrared)   Resp 16   Ht 167.6 cm (66\")   Wt 69.9 kg (154 lb)   SpO2 98%   BMI 24.86 kg/m²   Estimated body mass index is 24.86 kg/m² as calculated from the following:    Height as of this encounter: 167.6 cm (66\").    Weight as of this encounter: 69.9 kg (154 lb).       BMI is within normal parameters. No other follow-up for BMI required.      Physical Exam  Vitals and nursing note reviewed.   Constitutional:       Appearance: Normal appearance.   HENT:      Head: Normocephalic.      Nose: Nose " normal.      Mouth/Throat:      Mouth: Mucous membranes are moist.      Pharynx: Oropharynx is clear.   Eyes:      Pupils: Pupils are equal, round, and reactive to light.   Cardiovascular:      Rate and Rhythm: Normal rate and regular rhythm.      Pulses: Normal pulses.      Heart sounds: Normal heart sounds.   Pulmonary:      Effort: Pulmonary effort is normal. No respiratory distress.      Breath sounds: Normal breath sounds.   Abdominal:      General: Bowel sounds are normal.      Palpations: Abdomen is soft.   Musculoskeletal:         General: Normal range of motion.      Cervical back: Normal range of motion.      Right lower leg: Edema present.      Left lower leg: Edema present.   Skin:     General: Skin is warm and dry.      Capillary Refill: Capillary refill takes less than 2 seconds.      Findings: Erythema (to bilateral lower legs) and petechiae (to left lower leg) present.   Neurological:      General: No focal deficit present.      Mental Status: She is alert.          Result Review :                   Assessment and Plan   Diagnoses and all orders for this visit:    1. Soft tissue infection (Primary)  -     doxycycline (VIBRAMYCIN) 100 MG capsule; Take 1 capsule by mouth 2 (Two) Times a Day for 7 days.  Dispense: 14 capsule; Refill: 0    2. Lower extremity edema    3. Type 2 diabetes mellitus with other specified complication, with long-term current use of insulin      - Will initiate antibiotic for soft tissue infection. Patient to keep appointment with wound care.   - Encouraged monitoring of blood glucose and keeping diabetes controlled to promote healing.          Follow Up   Return if symptoms worsen or fail to improve.  Patient was given instructions and counseling regarding her condition or for health maintenance advice. Please see specific information pulled into the AVS if appropriate.     Signed by:    KARLA English Date: 07/22/24

## 2024-07-19 ENCOUNTER — OFFICE VISIT (OUTPATIENT)
Dept: WOUND CARE | Facility: HOSPITAL | Age: 79
End: 2024-07-19
Payer: MEDICARE

## 2024-07-19 DIAGNOSIS — L97.529 NON-PRESSURE CHRONIC ULCER OF OTHER PART OF LEFT FOOT WITH UNSPECIFIED SEVERITY: ICD-10-CM

## 2024-07-19 DIAGNOSIS — L03.116 CELLULITIS OF LEFT LOWER LIMB: ICD-10-CM

## 2024-07-19 DIAGNOSIS — A49.9 BACTERIAL INFECTION, UNSPECIFIED: ICD-10-CM

## 2024-07-19 DIAGNOSIS — L97.509 TYPE 2 DIABETES MELLITUS WITH FOOT ULCER, UNSPECIFIED WHETHER LONG TERM INSULIN USE: Primary | ICD-10-CM

## 2024-07-19 DIAGNOSIS — M79.672 PAIN IN LEFT FOOT: ICD-10-CM

## 2024-07-19 DIAGNOSIS — E11.40 TYPE 2 DIABETES MELLITUS WITH DIABETIC NEUROPATHY, UNSPECIFIED WHETHER LONG TERM INSULIN USE: ICD-10-CM

## 2024-07-19 DIAGNOSIS — E11.621 TYPE 2 DIABETES MELLITUS WITH FOOT ULCER, UNSPECIFIED WHETHER LONG TERM INSULIN USE: Primary | ICD-10-CM

## 2024-07-26 DIAGNOSIS — Z78.0 POST-MENOPAUSE: ICD-10-CM

## 2024-08-01 RX ORDER — SPIRONOLACTONE 25 MG/1
1 TABLET ORAL DAILY
COMMUNITY
Start: 2024-07-16

## 2024-08-06 ENCOUNTER — OFFICE VISIT (OUTPATIENT)
Dept: WOUND CARE | Facility: HOSPITAL | Age: 79
End: 2024-08-06
Payer: MEDICARE

## 2024-08-06 DIAGNOSIS — L97.509 TYPE 2 DIABETES MELLITUS WITH FOOT ULCER, UNSPECIFIED WHETHER LONG TERM INSULIN USE: Primary | ICD-10-CM

## 2024-08-06 DIAGNOSIS — E11.40 TYPE 2 DIABETES MELLITUS WITH DIABETIC NEUROPATHY, UNSPECIFIED WHETHER LONG TERM INSULIN USE: ICD-10-CM

## 2024-08-06 DIAGNOSIS — E11.621 TYPE 2 DIABETES MELLITUS WITH FOOT ULCER, UNSPECIFIED WHETHER LONG TERM INSULIN USE: Primary | ICD-10-CM

## 2024-08-06 DIAGNOSIS — L97.529 NON-PRESSURE CHRONIC ULCER OF OTHER PART OF LEFT FOOT WITH UNSPECIFIED SEVERITY: ICD-10-CM

## 2024-08-13 ENCOUNTER — OFFICE VISIT (OUTPATIENT)
Dept: INTERNAL MEDICINE | Facility: CLINIC | Age: 79
End: 2024-08-13
Payer: MEDICARE

## 2024-08-13 VITALS
DIASTOLIC BLOOD PRESSURE: 64 MMHG | RESPIRATION RATE: 17 BRPM | OXYGEN SATURATION: 97 % | HEIGHT: 66 IN | SYSTOLIC BLOOD PRESSURE: 124 MMHG | WEIGHT: 150 LBS | TEMPERATURE: 97.6 F | BODY MASS INDEX: 24.11 KG/M2 | HEART RATE: 74 BPM

## 2024-08-13 DIAGNOSIS — I10 PRIMARY HYPERTENSION: ICD-10-CM

## 2024-08-13 DIAGNOSIS — E11.69 TYPE 2 DIABETES MELLITUS WITH OTHER SPECIFIED COMPLICATION, WITH LONG-TERM CURRENT USE OF INSULIN: Primary | ICD-10-CM

## 2024-08-13 DIAGNOSIS — Z79.4 TYPE 2 DIABETES MELLITUS WITH OTHER SPECIFIED COMPLICATION, WITH LONG-TERM CURRENT USE OF INSULIN: Primary | ICD-10-CM

## 2024-08-13 LAB
EXPIRATION DATE: ABNORMAL
HBA1C MFR BLD: 7.6 % (ref 4.5–5.7)
Lab: ABNORMAL

## 2024-08-13 PROCEDURE — 99213 OFFICE O/P EST LOW 20 MIN: CPT | Performed by: NURSE PRACTITIONER

## 2024-08-13 PROCEDURE — 3051F HG A1C>EQUAL 7.0%<8.0%: CPT | Performed by: NURSE PRACTITIONER

## 2024-08-13 PROCEDURE — 83036 HEMOGLOBIN GLYCOSYLATED A1C: CPT | Performed by: NURSE PRACTITIONER

## 2024-08-13 RX ORDER — INSULIN GLARGINE-YFGN 100 [IU]/ML
25 INJECTION, SOLUTION SUBCUTANEOUS DAILY
Qty: 12 ML | Refills: 1 | Status: SHIPPED | OUTPATIENT
Start: 2024-08-13

## 2024-08-13 NOTE — PROGRESS NOTES
Subjective     Chief Complaint   Patient presents with    Diabetes       History of Present Illness  The patient is a 78-year-old female who presents for follow-up.    She reports an improvement in her leg condition. She has been managing the sore by soaking her leg in Epsom salt. She is under the care of [facility] and has appointments scheduled with her cardiologist. Her blood sugar levels have been stable, with occasional high readings, particularly when she consumes walnuts. Her respiratory function is normal, with no chest pain. Her current medications include metformin and insulin, Semglee 20 units in the morning and NovoLog. She self-adjusted her insulin dosage from 18 units to 15 units at lunchtime and 24 units at night. She wears compression socks throughout the day. Her bowel movements are regular. She monitors her blood sugar levels three times a day.    Otherwise complete ROS reviewed and negative except as mentioned in the HPI.    Past Medical History:   Past Medical History:   Diagnosis Date    CHF (congestive heart failure)     Diabetes mellitus     Hyperlipidemia     Hypertension      Past Surgical History:No past surgical history on file.  Social History:  reports that she has never smoked. She has never been exposed to tobacco smoke. She has never used smokeless tobacco. She reports that she does not drink alcohol and does not use drugs.    Family History: family history includes No Known Problems in her father and mother.       Allergies:  Allergies   Allergen Reactions    Penicillins Rash     Medications:  Prior to Admission medications    Medication Sig Start Date End Date Taking? Authorizing Provider   aspirin (Aspirin Adult Low Dose) 81 MG EC tablet Take 1 tablet every 3 weeks by oral route.    Provider, MD Akash   atorvastatin (LIPITOR) 40 MG tablet Take 1 tablet by mouth every night at bedtime.    Provider, MD Akash   Azelastine HCl 137 MCG/SPRAY solution  "TAKE SPRAY NASAL EVERY DAY 6/28/24   Akash Rendon MD   Calcium Carbonate-Vit D-Min (CALTRATE PLUS PO) Take  by mouth.    Akash Rendon MD   Cyanocobalamin (B-12) 1000 MCG tablet Take  by mouth.    Akash Rendon MD   Entresto 49-51 MG tablet TAKE 1 TABLET BY MOUTH TWICE A DAY. REPLACES LOSARTAN/HCTZ. MONITOR HOME BLOOD PRESSURE. 6/28/24   Zuleyka Sagastume APRN   estradiol (ESTRACE) 0.1 MG/GM vaginal cream INSERT 0.5 GRAMS 3 TIMES A WEEK BY VAGINAL ROUTE AT BEDTIME.    Akash Rendon MD   furosemide (LASIX) 20 MG tablet Take 1 tablet by mouth 2 (Two) Times a Day.    Akash Rendon MD   meloxicam (MOBIC) 7.5 MG tablet Take 1 tablet by mouth Daily.  Patient not taking: Reported on 7/15/2024    Akash Rendon MD   metFORMIN (GLUCOPHAGE) 1000 MG tablet Take 1 tablet by mouth 2 (Two) Times a Day.    Akash Rendon MD   metoprolol succinate XL (TOPROL-XL) 25 MG 24 hr tablet Take 1 tablet by mouth Daily.    Akash Rendon MD   mupirocin (BACTROBAN) 2 % ointment Apply 1 Application topically to the appropriate area as directed 2 (Two) Times a Day.  Patient not taking: Reported on 7/15/2024 4/4/24   Zuleyka Sagastume APRN   NON FORMULARY Balance of Nature OTC    Akash Rendon MD Semglee, yfgn, 100 UNIT/ML solution pen-injector INJECT 18 UNITS EVERY MORNING    Akash Rendon MD   spironolactone (ALDACTONE) 25 MG tablet Take 1 tablet by mouth Daily. 7/16/24   Akash Rendon MD       Objective     Vital Signs: /64   Pulse 74   Temp 97.6 °F (36.4 °C)   Resp 17   Ht 167.6 cm (66\")   Wt 68 kg (150 lb)   SpO2 97%   BMI 24.21 kg/m²     Physical Exam    Physical Exam  Vitals reviewed.   Constitutional:       Appearance: Normal appearance. She is well-developed.   HENT:      Head: Normocephalic and atraumatic.   Eyes:      Pupils: Pupils are equal, round, and reactive to light.   Neck:      Vascular: No JVD.   Cardiovascular: "      Rate and Rhythm: Normal rate and regular rhythm.   Pulmonary:      Effort: Pulmonary effort is normal.   Abdominal:      General: Bowel sounds are normal.      Palpations: Abdomen is soft.   Musculoskeletal:         General: Swelling present. No deformity (2+).      Cervical back: Normal range of motion and neck supple.   Lymphadenopathy:      Cervical: No cervical adenopathy.   Skin:     General: Skin is warm and dry.   Neurological:      Mental Status: She is alert and oriented to person, place, and time.   Psychiatric:         Behavior: Behavior normal.         Thought Content: Thought content normal.         Judgment: Judgment normal.       BMI is within normal parameters. No other follow-up for BMI required.    Results Reviewed:  Glucose   Date Value Ref Range Status   04/25/2024 161 (H) 70 - 99 mg/dL Final     BUN   Date Value Ref Range Status   04/25/2024 29 (H) 8 - 27 mg/dL Final     Creatinine   Date Value Ref Range Status   04/25/2024 0.87 0.57 - 1.00 mg/dL Final     Sodium   Date Value Ref Range Status   04/25/2024 142 134 - 144 mmol/L Final     Potassium   Date Value Ref Range Status   04/25/2024 5.2 3.5 - 5.2 mmol/L Final     Chloride   Date Value Ref Range Status   04/25/2024 105 96 - 106 mmol/L Final     Total CO2   Date Value Ref Range Status   04/25/2024 24 20 - 29 mmol/L Final     Calcium   Date Value Ref Range Status   04/25/2024 9.4 8.7 - 10.3 mg/dL Final     ALT (SGPT)   Date Value Ref Range Status   04/25/2024 23 0 - 32 IU/L Final     AST (SGOT)   Date Value Ref Range Status   04/25/2024 25 0 - 40 IU/L Final     WBC   Date Value Ref Range Status   04/25/2024 5.7 3.4 - 10.8 x10E3/uL Final     Hematocrit   Date Value Ref Range Status   04/25/2024 34.7 34.0 - 46.6 % Final     Platelets   Date Value Ref Range Status   04/25/2024 212 150 - 450 x10E3/uL Final     Triglycerides   Date Value Ref Range Status   04/25/2024 95 0 - 149 mg/dL Final     HDL Cholesterol   Date Value Ref Range Status    04/25/2024 61 >39 mg/dL Final     LDL Chol Calc (NIH)   Date Value Ref Range Status   04/25/2024 48 0 - 99 mg/dL Final     Hemoglobin A1C   Date Value Ref Range Status   04/25/2024 7.0 (H) 4.8 - 5.6 % Final     Comment:              Prediabetes: 5.7 - 6.4           Diabetes: >6.4           Glycemic control for adults with diabetes: <7.0         Results  Laboratory Studies  A1c is 7.6.      Assessment / Plan     Assessment/Plan:  Diagnoses and all orders for this visit:    1. Type 2 diabetes mellitus with other specified complication, with long-term current use of insulin (Primary)  -     POC Glycosylated Hemoglobin (Hb A1C)  -     Semglee, yfgn, 100 UNIT/ML solution pen-injector; Inject 25 Units under the skin into the appropriate area as directed Daily.  Dispense: 12 mL; Refill: 1    2. Primary hypertension     Stable on current medications.     Assessment & Plan  1. Diabetes.  An updated prescription for 25 units of Semglee has been issued. Initially, the dosage should be increased to 22 units, but the prescription has been written in case the dosage needs to be increased to 25 units. She has been advised to monitor her blood sugar levels frequently.      Return in about 3 months (around 11/13/2024). unless patient needs to be seen sooner or acute issues arise.    Code Status: Full.   Patient or patient representative verbalized consent for the use of Ambient Listening during the visit with  KARLA Roth for chart documentation. 8/13/2024  10:28 CDT  I have discussed the patient results/orders and and plan/recommendation with them at today's visit.      Signed by:    KARLA Roth Date: 08/13/24

## 2024-08-19 RX ORDER — SACUBITRIL AND VALSARTAN 49; 51 MG/1; MG/1
TABLET, FILM COATED ORAL
Qty: 60 TABLET | Refills: 1 | Status: SHIPPED | OUTPATIENT
Start: 2024-08-19

## 2024-08-22 ENCOUNTER — OFFICE VISIT (OUTPATIENT)
Dept: WOUND CARE | Facility: HOSPITAL | Age: 79
End: 2024-08-22
Payer: MEDICARE

## 2024-08-22 DIAGNOSIS — L97.509 TYPE 2 DIABETES MELLITUS WITH FOOT ULCER, UNSPECIFIED WHETHER LONG TERM INSULIN USE: Primary | ICD-10-CM

## 2024-08-22 DIAGNOSIS — E11.40 TYPE 2 DIABETES MELLITUS WITH DIABETIC NEUROPATHY, UNSPECIFIED WHETHER LONG TERM INSULIN USE: ICD-10-CM

## 2024-08-22 DIAGNOSIS — L97.522 NON-PRESSURE CHRONIC ULCER OF OTHER PART OF LEFT FOOT WITH FAT LAYER EXPOSED: ICD-10-CM

## 2024-08-22 DIAGNOSIS — E11.621 TYPE 2 DIABETES MELLITUS WITH FOOT ULCER, UNSPECIFIED WHETHER LONG TERM INSULIN USE: Primary | ICD-10-CM

## 2024-09-09 NOTE — PROGRESS NOTES
[facility] - PODIATRY    Today's Date: 09/11/2024     Patient Name: Yvrose Ingram  MRN: [id number]  CSN: [id number]  PCP: Zuleyka Sagastume APRN  Referring Provider: No ref. provider found    SUBJECTIVE     Chief Complaint   Patient presents with    Follow-up     Zuleyka Sagastume 05/21/2024 Return in about 3 months- pt states feet doing good, nails need trimmed. Sore on foot is almost healed so they tell her- pt denies pain     Diabetes     167mg/dl BG last night after supper     HPI: Yvrose Ingram, a 78 y.o.female, comes to clinic as a(n) new patient presenting for diabetic foot exam and complaining of toenail/callus issues. Patient has h/o CHF, type II DM, hyperlipidemia, hypertension . Patient is IDDM with last stated BG level of 167mg/dl. Last A1C was around 7.6%.  Here today with reports that her toenails are elongated and  thickened.  Patient denies any numbness or tingling in her feet.  States she continues to follow with outpatient [facility] for the wound to her left foot. She has no new issues or complaints today.  Denies pain currently. Relates previous treatment(s) including care by podiatrist in [place] . Denies any constitutional symptoms. No other pedal complaints at this time.    Past Medical History:   Diagnosis Date    CHF (congestive heart failure)     Diabetes mellitus     Hyperlipidemia     Hypertension      History reviewed. No pertinent surgical history.  Family History   Problem Relation Age of Onset    No Known Problems Mother     No Known Problems Father      Social History     Socioeconomic History    Marital status:    Tobacco Use    Smoking status: Never     Passive exposure: Never    Smokeless tobacco: Never   Vaping Use    Vaping status: Never Used   Substance and Sexual Activity    Alcohol use: Never    Drug use: Never    Sexual activity: Not Currently     Allergies   Allergen Reactions    Penicillins Rash     Current Outpatient Medications   Medication  Sig Dispense Refill    aspirin (Aspirin Adult Low Dose) 81 MG EC tablet Take 1 tablet every 3 weeks by oral route.      atorvastatin (LIPITOR) 40 MG tablet Take 1 tablet by mouth every night at bedtime.      Azelastine HCl 137 MCG/SPRAY solution TAKE SPRAY NASAL EVERY DAY      Calcium Carbonate-Vit D-Min (CALTRATE PLUS PO) Take  by mouth.      Cyanocobalamin (B-12) 1000 MCG tablet Take  by mouth.      dapagliflozin Propanediol (Farxiga) 10 MG tablet Take  by mouth.      Entresto 49-51 MG tablet TAKE 1 TABLET BY MOUTH TWICE A DAY. REPLACES LOSARTAN/HCTZ. MONITOR HOME BLOOD PRESSURE. 60 tablet 1    estradiol (ESTRACE) 0.1 MG/GM vaginal cream INSERT 0.5 GRAMS 3 TIMES A WEEK BY VAGINAL ROUTE AT BEDTIME.      furosemide (LASIX) 20 MG tablet Take 1 tablet by mouth 2 (Two) Times a Day.      meloxicam (MOBIC) 7.5 MG tablet Take 1 tablet by mouth Daily.      metFORMIN (GLUCOPHAGE) 1000 MG tablet Take 1 tablet by mouth 2 (Two) Times a Day.      metoprolol succinate XL (TOPROL-XL) 25 MG 24 hr tablet Take 1 tablet by mouth Daily.      mupirocin (BACTROBAN) 2 % ointment Apply 1 Application topically to the appropriate area as directed 2 (Two) Times a Day. 22 g 1    NON FORMULARY Balance of Nature OTC      Semglee, yfgn, 100 UNIT/ML solution pen-injector Inject 25 Units under the skin into the appropriate area as directed Daily. 12 mL 1    spironolactone (ALDACTONE) 25 MG tablet Take 1 tablet by mouth Daily.       No current facility-administered medications for this visit.     Review of Systems   Constitutional:  Negative for activity change and fever.   HENT:  Negative for congestion.    Respiratory:  Negative for chest tightness and shortness of breath.    Cardiovascular:  Positive for leg swelling. Negative for chest pain.   Gastrointestinal:  Negative for abdominal pain, constipation, diarrhea, nausea and vomiting.   Musculoskeletal:  Negative for arthralgias and gait problem.   Skin:  Positive for color change and wound.         Wound to left foot.  Occasional drainage noted.  Thickened, elongated toenails   Neurological:  Positive for numbness. Negative for dizziness and weakness.   Psychiatric/Behavioral:  Negative for agitation, behavioral problems and confusion.        OBJECTIVE     Vitals:    24 1400   BP: 126/64         PHYSICAL EXAM  GEN:   Accompanied by none.     Foot/Ankle Exam    GENERAL  Diabetic foot exam performed    Appearance:  appears stated age  Orientation:  AAOx3  Affect:  appropriate  Gait:  unimpaired  Assistance:  independent  Right shoe gear: sandal  Left shoe gear: sandal    VASCULAR     Right Foot Vascularity   Dorsalis pedis:  2+  Posterior tibial:  2+  Skin temperature:  warm  Edema gradin+ and pitting  CFT:  3  Pedal hair growth:  Present     Left Foot Vascularity   Dorsalis pedis:  2+  Posterior tibial:  2+  Skin temperature:  warm  Edema gradin+ and pitting  CFT:  3  Pedal hair growth:  Present     NEUROLOGIC     Right Foot Neurologic   Light touch sensation: diminished  Vibratory sensation: diminished  Hot/Cold sensation: diminished  Protective Sensation using [place]-Radha Monofilament:   Sites intact: 9  Sites tested: 10     Left Foot Neurologic   Light touch sensation: diminished  Vibratory sensation: diminished  Hot/Cold sensation:  diminished  Protective Sensation using [place]-Radha Monofilament:   Sites intact: 8  Sites tested: 10    MUSCULOSKELETAL     Right Foot Musculoskeletal   Ecchymosis:  none  Tenderness:  toenail problem    Arch:  Normal     Left Foot Musculoskeletal   Ecchymosis:  none  Tenderness:  lateral foot tenderness and toenail problem  Arch:  Normal    MUSCLE STRENGTH     Right Foot Muscle Strength   Foot dorsiflexion:  5  Foot plantar flexion:  5  Foot inversion:  5  Foot eversion:  5     Left Foot Muscle Strength   Foot dorsiflexion:  5  Foot plantar flexion:  5  Foot inversion:  5  Foot eversion:  5    RANGE OF MOTION     Right Foot Range of Motion   Foot  and ankle ROM within normal limits       Left Foot Range of Motion   Foot and ankle ROM within normal limits      DERMATOLOGIC      Right Foot Dermatologic   Skin  Positive for atrophy.   Nails  1.  Positive for onychomycosis, abnormal thickness, subungual debris and dystrophic nail.  2.  Positive for elongated and abnormal thickness.  3.  Positive for elongated and abnormal thickness.  4.  Positive for elongated and abnormal thickness.  5.  Positive for elongated and abnormal thickness.     Left Foot Dermatologic   Skin  Positive for corn, ulcer and atrophy.   Nails  1.  Positive for onychomycosis, abnormal thickness, subungual debris and dystrophic nail.  2.  Positive for elongated and abnormal thickness.  3.  Positive for elongated and abnormal thickness.  4.  Positive for elongated and abnormally thick.  5.  Positive for elongated and abnormally thick.     Left foot additional comments: Wound to lateral foot still present. Bandage removed to evaluate. Appears superficial and without active SOI noted today. Bandage reapplied. Patient to continue ongoing treatment with outpatient [facility].      RADIOLOGY/NUCLEAR:  No results found.    LABORATORY/CULTURE RESULTS:      PATHOLOGY RESULTS:       ASSESSMENT/PLAN     Diagnoses and all orders for this visit:    1. Thickened nails (Primary)    2. Left foot pain    3. Type 2 diabetes mellitus without complication, with long-term current use of insulin    4. Neuropathic ulcer of left foot, unspecified ulcer stage    5. Bilateral lower extremity edema    6. Encounter for diabetic foot exam        Comprehensive lower extremity examination and evaluation was performed.  Discussed findings and treatment plan including risks, benefits, and treatment options with patient in detail. Patient agreed with treatment plan.  After verbal consent obtained, nail(s) x10 debrided of length and thickness with nail nipper without incidence  Patient may maintain nails and calluses at home utilizing  emery board or pumice stone between visits as needed  Reviewed at home diabetic foot care including daily foot checks   DFE performed at today's visit.  Wound to lateral foot still present. Bandage removed to evaluate. Appears superficial and without active SOI noted today. Bandage reapplied. Patient to continue ongoing treatment with outpatient [facility].  Control and management of type II DM to be continued per PCP.  Recommend elevation of lower extremities while at rest and compression stockings for swelling.   Next appointment scheduled for 3 months.    An After Visit Summary was printed and given to the patient at discharge, including (if requested) any available informative/educational handouts regarding diagnosis, treatment, or medications. All questions were answered to patient/family satisfaction. Should symptoms fail to improve or worsen they agree to call or return to clinic or to go to the Emergency Department. Discussed the importance of following up with any needed screening tests/labs/specialist appointments and any requested follow-up recommended by me today. Importance of maintaining follow-up discussed and patient accepts that missed appointments can delay diagnosis and potentially lead to worsening of conditions.  Return in about 3 months (around 12/11/2024) for Follow-up with KARLA, [facility] in [facility] or sooner if acute issues arise.      This document has been electronically signed by KARLA Gutierrez on September 16, 2024 10:27 CDT

## 2024-09-11 ENCOUNTER — OFFICE VISIT (OUTPATIENT)
Age: 79
End: 2024-09-11
Payer: MEDICARE

## 2024-09-11 VITALS
SYSTOLIC BLOOD PRESSURE: 126 MMHG | BODY MASS INDEX: 24.11 KG/M2 | HEIGHT: 66 IN | DIASTOLIC BLOOD PRESSURE: 64 MMHG | WEIGHT: 150 LBS

## 2024-09-11 DIAGNOSIS — R60.0 BILATERAL LOWER EXTREMITY EDEMA: ICD-10-CM

## 2024-09-11 DIAGNOSIS — M79.672 LEFT FOOT PAIN: ICD-10-CM

## 2024-09-11 DIAGNOSIS — E11.9 TYPE 2 DIABETES MELLITUS WITHOUT COMPLICATION, WITH LONG-TERM CURRENT USE OF INSULIN: ICD-10-CM

## 2024-09-11 DIAGNOSIS — E11.9 ENCOUNTER FOR DIABETIC FOOT EXAM: ICD-10-CM

## 2024-09-11 DIAGNOSIS — L60.2 THICKENED NAILS: Primary | ICD-10-CM

## 2024-09-11 DIAGNOSIS — L97.529 NEUROPATHIC ULCER OF LEFT FOOT, UNSPECIFIED ULCER STAGE: ICD-10-CM

## 2024-09-11 DIAGNOSIS — Z79.4 TYPE 2 DIABETES MELLITUS WITHOUT COMPLICATION, WITH LONG-TERM CURRENT USE OF INSULIN: ICD-10-CM

## 2024-09-11 PROCEDURE — 1159F MED LIST DOCD IN RCRD: CPT

## 2024-09-11 PROCEDURE — 99213 OFFICE O/P EST LOW 20 MIN: CPT

## 2024-09-11 PROCEDURE — 1160F RVW MEDS BY RX/DR IN RCRD: CPT

## 2024-09-11 PROCEDURE — 11721 DEBRIDE NAIL 6 OR MORE: CPT

## 2024-09-11 RX ORDER — DAPAGLIFLOZIN 10 MG/1
TABLET, FILM COATED ORAL
COMMUNITY

## 2024-09-23 ENCOUNTER — OFFICE VISIT (OUTPATIENT)
Dept: WOUND CARE | Facility: HOSPITAL | Age: 79
End: 2024-09-23
Payer: MEDICARE

## 2024-09-23 DIAGNOSIS — L97.509 TYPE 2 DIABETES MELLITUS WITH FOOT ULCER, UNSPECIFIED WHETHER LONG TERM INSULIN USE: Primary | ICD-10-CM

## 2024-09-23 DIAGNOSIS — E11.621 TYPE 2 DIABETES MELLITUS WITH FOOT ULCER, UNSPECIFIED WHETHER LONG TERM INSULIN USE: Primary | ICD-10-CM

## 2024-09-23 DIAGNOSIS — L97.522 NON-PRESSURE CHRONIC ULCER OF OTHER PART OF LEFT FOOT WITH FAT LAYER EXPOSED: ICD-10-CM

## 2024-09-23 PROCEDURE — 11042 DBRDMT SUBQ TIS 1ST 20SQCM/<: CPT | Performed by: PODIATRIST

## 2024-10-11 ENCOUNTER — OFFICE VISIT (OUTPATIENT)
Dept: WOUND CARE | Facility: HOSPITAL | Age: 79
End: 2024-10-11
Payer: MEDICARE

## 2024-10-11 PROCEDURE — G0463 HOSPITAL OUTPT CLINIC VISIT: HCPCS

## 2024-11-12 ENCOUNTER — OFFICE VISIT (OUTPATIENT)
Dept: INTERNAL MEDICINE | Facility: CLINIC | Age: 79
End: 2024-11-12
Payer: MEDICARE

## 2024-11-12 VITALS
WEIGHT: 141 LBS | HEIGHT: 66 IN | TEMPERATURE: 97.5 F | DIASTOLIC BLOOD PRESSURE: 70 MMHG | HEART RATE: 76 BPM | SYSTOLIC BLOOD PRESSURE: 126 MMHG | RESPIRATION RATE: 18 BRPM | OXYGEN SATURATION: 97 % | BODY MASS INDEX: 22.66 KG/M2

## 2024-11-12 DIAGNOSIS — Z79.4 TYPE 2 DIABETES MELLITUS WITH OTHER SPECIFIED COMPLICATION, WITH LONG-TERM CURRENT USE OF INSULIN: Primary | ICD-10-CM

## 2024-11-12 DIAGNOSIS — I10 PRIMARY HYPERTENSION: ICD-10-CM

## 2024-11-12 DIAGNOSIS — E11.69 TYPE 2 DIABETES MELLITUS WITH OTHER SPECIFIED COMPLICATION, WITH LONG-TERM CURRENT USE OF INSULIN: Primary | ICD-10-CM

## 2024-11-12 DIAGNOSIS — Z23 NEED FOR VACCINATION: ICD-10-CM

## 2024-11-12 PROCEDURE — 1160F RVW MEDS BY RX/DR IN RCRD: CPT | Performed by: NURSE PRACTITIONER

## 2024-11-12 PROCEDURE — G0009 ADMIN PNEUMOCOCCAL VACCINE: HCPCS | Performed by: NURSE PRACTITIONER

## 2024-11-12 PROCEDURE — 90677 PCV20 VACCINE IM: CPT | Performed by: NURSE PRACTITIONER

## 2024-11-12 PROCEDURE — 1159F MED LIST DOCD IN RCRD: CPT | Performed by: NURSE PRACTITIONER

## 2024-11-12 PROCEDURE — 99214 OFFICE O/P EST MOD 30 MIN: CPT | Performed by: NURSE PRACTITIONER

## 2024-11-12 NOTE — PROGRESS NOTES
Subjective     Chief Complaint   Patient presents with    Diabetes       History of Present Illness  The patient is a 78-year-old female who presents for evaluation of multiple medical concerns.    She has experienced an unintentional weight loss of 9 pounds over the past few months. She reports no swelling, although she did notice some puffiness in her legs this morning. Her blood sugar levels have been generally well-controlled, with occasional spikes. She admits to not adhering to a healthy diet.    She received her influenza vaccine on 09/04/2024 at [facility]. Her last eye examination was in 04/2023, and she has been experiencing significant eye discomfort. She notes that her vision is slightly blurred when she removes her glasses. She also reports that her eyes cause her discomfort, leading her to sit down and close them frequently.    She has not had a mammogram this year. She reports no chest pain and has normal bowel movements. She takes metoprolol in the morning, which she believes may be causing her to feel excessively sleepy. She sleeps well at night.    Otherwise complete ROS reviewed and negative except as mentioned in the HPI.    Past Medical History:   Past Medical History:   Diagnosis Date    CHF (congestive heart failure)     Diabetes mellitus     Hyperlipidemia     Hypertension      Past Surgical History:No past surgical history on file.  Social History:  reports that she has never smoked. She has never been exposed to tobacco smoke. She has never used smokeless tobacco. She reports that she does not drink alcohol and does not use drugs.    Family History: family history includes No Known Problems in her father and mother.       Allergies:  Allergies   Allergen Reactions    Penicillins Rash     Medications:  Prior to Admission medications    Medication Sig Start Date End Date Taking? Authorizing Provider   aspirin (Aspirin Adult Low Dose) 81 MG EC tablet Take 1 tablet every 3 weeks by oral route.  "   Akash Rendon MD   atorvastatin (LIPITOR) 40 MG tablet Take 1 tablet by mouth every night at bedtime.    Akash Rendon MD   Azelastine HCl 137 MCG/SPRAY solution TAKE SPRAY NASAL EVERY DAY 6/28/24   Akash Rendon MD   Calcium Carbonate-Vit D-Min (CALTRATE PLUS PO) Take  by mouth.    Akash Rendon MD   Cyanocobalamin (B-12) 1000 MCG tablet Take  by mouth.    Akash Rendon MD   dapagliflozin Propanediol (Farxiga) 10 MG tablet Take  by mouth.    Akash Rendon MD   Entresto 49-51 MG tablet TAKE 1 TABLET BY MOUTH TWICE A DAY. REPLACES LOSARTAN/HCTZ. MONITOR HOME BLOOD PRESSURE. 8/19/24   Zuleyka Sagastume APRN   estradiol (ESTRACE) 0.1 MG/GM vaginal cream INSERT 0.5 GRAMS 3 TIMES A WEEK BY VAGINAL ROUTE AT BEDTIME.    Akash Rendon MD   furosemide (LASIX) 20 MG tablet Take 1 tablet by mouth 2 (Two) Times a Day.    Akash Rendon MD   meloxicam (MOBIC) 7.5 MG tablet Take 1 tablet by mouth Daily.    Akash Rendon MD   metFORMIN (GLUCOPHAGE) 1000 MG tablet Take 1 tablet by mouth 2 (Two) Times a Day.    Akash Rendon MD   metoprolol succinate XL (TOPROL-XL) 25 MG 24 hr tablet Take 1 tablet by mouth Daily.    Akash Rendon MD   mupirocin (BACTROBAN) 2 % ointment Apply 1 Application topically to the appropriate area as directed 2 (Two) Times a Day. 4/4/24   Zuleyka Sagastume APRN   NON FORMULARY Balance of Nature OTC    Akash Rendon MD Semglee, yfgn, 100 UNIT/ML solution pen-injector Inject 25 Units under the skin into the appropriate area as directed Daily. 8/13/24   Zuleyka Sagastume APRN   spironolactone (ALDACTONE) 25 MG tablet Take 1 tablet by mouth Daily. 7/16/24   Akash Rendon MD       Objective     Vital Signs: /70   Pulse 76   Temp 97.5 °F (36.4 °C)   Resp 18   Ht 167.6 cm (66\")   Wt 64 kg (141 lb)   SpO2 97%   BMI 22.76 kg/m²     Physical Exam    Physical Exam  Vitals " reviewed.   Constitutional:       Appearance: Normal appearance. She is well-developed.   HENT:      Head: Normocephalic and atraumatic.   Eyes:      Pupils: Pupils are equal, round, and reactive to light.   Neck:      Vascular: No JVD.   Cardiovascular:      Rate and Rhythm: Normal rate and regular rhythm.   Pulmonary:      Effort: Pulmonary effort is normal.      Breath sounds: Normal breath sounds.   Abdominal:      General: Bowel sounds are normal.      Palpations: Abdomen is soft.   Musculoskeletal:         General: No deformity.      Cervical back: Normal range of motion and neck supple.   Lymphadenopathy:      Cervical: No cervical adenopathy.   Skin:     General: Skin is warm and dry.   Neurological:      Mental Status: She is alert and oriented to person, place, and time.   Psychiatric:         Behavior: Behavior normal.         Thought Content: Thought content normal.         Judgment: Judgment normal.     BMI is within normal parameters. No other follow-up for BMI required.      Results Reviewed:  Glucose   Date Value Ref Range Status   04/25/2024 161 (H) 70 - 99 mg/dL Final     BUN   Date Value Ref Range Status   04/25/2024 29 (H) 8 - 27 mg/dL Final     Creatinine   Date Value Ref Range Status   04/25/2024 0.87 0.57 - 1.00 mg/dL Final     Sodium   Date Value Ref Range Status   04/25/2024 142 134 - 144 mmol/L Final     Potassium   Date Value Ref Range Status   04/25/2024 5.2 3.5 - 5.2 mmol/L Final     Chloride   Date Value Ref Range Status   04/25/2024 105 96 - 106 mmol/L Final     Total CO2   Date Value Ref Range Status   04/25/2024 24 20 - 29 mmol/L Final     Calcium   Date Value Ref Range Status   04/25/2024 9.4 8.7 - 10.3 mg/dL Final     ALT (SGPT)   Date Value Ref Range Status   04/25/2024 23 0 - 32 IU/L Final     AST (SGOT)   Date Value Ref Range Status   04/25/2024 25 0 - 40 IU/L Final     WBC   Date Value Ref Range Status   04/25/2024 5.7 3.4 - 10.8 x10E3/uL Final     Hematocrit   Date Value Ref  Range Status   04/25/2024 34.7 34.0 - 46.6 % Final     Platelets   Date Value Ref Range Status   04/25/2024 212 150 - 450 x10E3/uL Final     Triglycerides   Date Value Ref Range Status   04/25/2024 95 0 - 149 mg/dL Final     HDL Cholesterol   Date Value Ref Range Status   04/25/2024 61 >39 mg/dL Final     LDL Chol Calc (NIH)   Date Value Ref Range Status   04/25/2024 48 0 - 99 mg/dL Final     Hemoglobin A1C   Date Value Ref Range Status   08/13/2024 7.6 (A) 4.5 - 5.7 % Final       Results        Assessment / Plan     Assessment/Plan:  Diagnoses and all orders for this visit:    1. Type 2 diabetes mellitus with other specified complication, with long-term current use of insulin (Primary)  -     CBC & Differential  -     Comprehensive Metabolic Panel  -     Hemoglobin A1c    2. Primary hypertension  -     CBC & Differential  -     Comprehensive Metabolic Panel    3. Need for vaccination  -     Pneumococcal Conjugate Vaccine 20-Valent All      Assessment & Plan  1. Diabetes Mellitus.  Her blood sugar levels are generally well-controlled, with occasional high readings. She is advised to bring her blood sugar logs to future appointments to help monitor and adjust her treatment plan as needed. She reports no significant impact of diabetes on her vision during her last eye exam in April, but she is experiencing blurred vision and will need to schedule an eye exam soon.    3. Hypertension.  Her blood pressure readings are within the normal range. She is currently taking metoprolol in the morning but has been experiencing sleepiness. She is advised to switch her metoprolol intake to the evening to reduce daytime fatigue.    4. Health Maintenance.  She received a flu shot on September 4 at [facility]. A pneumonia vaccine was recommended and subsequently ordered due to her age and diabetes. She is advised to get her annual mammogram, which was missed this year. Blood work was also ordered.       unless patient needs to be seen sooner  or acute issues arise.    Code Status: Full.   Patient or patient representative verbalized consent for the use of Ambient Listening during the visit with  KARLA Roth for chart documentation. 11/12/2024  14:55 CST  I have discussed the patient results/orders and and plan/recommendation with them at today's visit.      Signed by:    KARLA Roth Date: 11/12/24

## 2024-11-12 NOTE — LETTER
[facility]  Vaccine Consent Form    Patient Name:  Yvrose Ingram  Patient :  1945     Vaccine(s) Ordered    Pneumococcal Conjugate Vaccine 20-Valent All        Screening Checklist  The following questions should be completed prior to vaccination. If you answer “yes” to any question, it does not necessarily mean you should not be vaccinated. It just means we may need to clarify or ask more questions. If a question is unclear, please ask your healthcare provider to explain it.    Yes No   Any fever or moderate to severe illness today (mild illness and/or antibiotic treatment are not contraindications)?     Do you have a history of a serious reaction to any previous vaccinations, such as anaphylaxis, encephalopathy within 7 days, Guillain-Lafayette syndrome within 6 weeks, seizure?     Have you received any live vaccine(s) (e.g MMR, HERNAN) or any other vaccines in the last month (to ensure duplicate doses aren't given)?     Do you have an anaphylactic allergy to latex (DTaP, DTaP-IPV, Hep A, Hep B, MenB, RV, Td, Tdap), baker’s yeast (Hep B, HPV), polysorbates (RSV, nirsevimab, PCV 20, Rotavirrus, Tdap, Shingrix), or gelatin (HERNAN, MMR)?     Do you have an anaphylactic allergy to neomycin (Rabies, HERNAN, MMR, IPV, Hep A), polymyxin B (IPV), or streptomycin (IPV)?      Any cancer, leukemia, AIDS, or other immune system disorder? (HERNAN, MMR, RV)     Do you have a parent, brother, or sister with an immune system problem (if immune competence of vaccine recipient clinically verified, can proceed)? (MMR, HERNAN)     Any recent steroid treatments for >2 weeks, chemotherapy, or radiation treatment? (HERNAN, MMR)     Have you received antibody-containing blood transfusions or IVIG in the past 11 months (recommended interval is dependent on product)? (MMR, HERNAN)     Have you taken antiviral drugs (acyclovir, famciclovir, valacyclovir for HERNAN) in the last 24 or 48 hours, respectively?      Are you pregnant or planning to become  "pregnant within 1 month? (HERNAN, MMR, HPV, IPV, MenB, Abrexvy; For Hep B- refer to Engerix-B; For RSV - Abrysvo is indicated for 32-36 weeks of pregnancy from September to January)     For infants, have you ever been told your child has had intussusception or a medical emergency involving obstruction of the intestine (Rotavirus)? If not for an infant, can skip this question.         *Ordering Physicians/APC should be consulted if \"yes\" is checked by the patient or guardian above.  I have received, read, and understand the Vaccine Information Statement (VIS) for each vaccine ordered.  I have considered my or my child's health status as well as the health status of my close contacts.  I have taken the opportunity to discuss my vaccine questions with my or my child's health care provider.   I have requested that the ordered vaccine(s) be given to me or my child.  I understand the benefits and risks of the vaccines.  I understand that I should remain in the clinic for 15 minutes after receiving the vaccine(s).  _________________________________________________________  Signature of Patient or Parent/Legal Guardian ____________________  Date     "

## 2024-11-13 LAB
ALBUMIN SERPL-MCNC: 4.4 G/DL (ref 3.8–4.8)
ALP SERPL-CCNC: 70 IU/L (ref 44–121)
ALT SERPL-CCNC: 26 IU/L (ref 0–32)
AST SERPL-CCNC: 34 IU/L (ref 0–40)
BASOPHILS # BLD AUTO: 0 X10E3/UL (ref 0–0.2)
BASOPHILS NFR BLD AUTO: 0 %
BILIRUB SERPL-MCNC: 0.3 MG/DL (ref 0–1.2)
BUN SERPL-MCNC: 36 MG/DL (ref 8–27)
BUN/CREAT SERPL: 33 (ref 12–28)
CALCIUM SERPL-MCNC: 10.2 MG/DL (ref 8.7–10.3)
CHLORIDE SERPL-SCNC: 104 MMOL/L (ref 96–106)
CO2 SERPL-SCNC: 24 MMOL/L (ref 20–29)
CREAT SERPL-MCNC: 1.1 MG/DL (ref 0.57–1)
EGFRCR SERPLBLD CKD-EPI 2021: 52 ML/MIN/1.73
EOSINOPHIL # BLD AUTO: 0.2 X10E3/UL (ref 0–0.4)
EOSINOPHIL NFR BLD AUTO: 3 %
ERYTHROCYTE [DISTWIDTH] IN BLOOD BY AUTOMATED COUNT: 12.3 % (ref 11.7–15.4)
GLOBULIN SER CALC-MCNC: 2.3 G/DL (ref 1.5–4.5)
GLUCOSE SERPL-MCNC: 82 MG/DL (ref 70–99)
HBA1C MFR BLD: 7.1 % (ref 4.8–5.6)
HCT VFR BLD AUTO: 35.4 % (ref 34–46.6)
HGB BLD-MCNC: 10.9 G/DL (ref 11.1–15.9)
IMM GRANULOCYTES # BLD AUTO: 0 X10E3/UL (ref 0–0.1)
IMM GRANULOCYTES NFR BLD AUTO: 0 %
LYMPHOCYTES # BLD AUTO: 1.6 X10E3/UL (ref 0.7–3.1)
LYMPHOCYTES NFR BLD AUTO: 29 %
MCH RBC QN AUTO: 28.2 PG (ref 26.6–33)
MCHC RBC AUTO-ENTMCNC: 30.8 G/DL (ref 31.5–35.7)
MCV RBC AUTO: 92 FL (ref 79–97)
MONOCYTES # BLD AUTO: 0.5 X10E3/UL (ref 0.1–0.9)
MONOCYTES NFR BLD AUTO: 9 %
NEUTROPHILS # BLD AUTO: 3.2 X10E3/UL (ref 1.4–7)
NEUTROPHILS NFR BLD AUTO: 59 %
PLATELET # BLD AUTO: 235 X10E3/UL (ref 150–450)
POTASSIUM SERPL-SCNC: 5.8 MMOL/L (ref 3.5–5.2)
PROT SERPL-MCNC: 6.7 G/DL (ref 6–8.5)
RBC # BLD AUTO: 3.86 X10E6/UL (ref 3.77–5.28)
SODIUM SERPL-SCNC: 142 MMOL/L (ref 134–144)
WBC # BLD AUTO: 5.6 X10E3/UL (ref 3.4–10.8)

## 2024-11-14 DIAGNOSIS — E87.5 HYPERKALEMIA: Primary | ICD-10-CM

## 2024-11-14 NOTE — PROGRESS NOTES
Called pt with results and recommendations. Patient voiced understanding. Can you put in lab orders that you want repeated? Pt has lab apt in 1 week.

## 2024-11-19 ENCOUNTER — LAB (OUTPATIENT)
Dept: INTERNAL MEDICINE | Facility: CLINIC | Age: 79
End: 2024-11-19
Payer: MEDICARE

## 2024-11-19 DIAGNOSIS — E87.5 HYPERKALEMIA: Primary | ICD-10-CM

## 2024-11-20 LAB
BUN SERPL-MCNC: 35 MG/DL (ref 8–27)
BUN/CREAT SERPL: 33 (ref 12–28)
CALCIUM SERPL-MCNC: 9.6 MG/DL (ref 8.7–10.3)
CHLORIDE SERPL-SCNC: 103 MMOL/L (ref 96–106)
CO2 SERPL-SCNC: 25 MMOL/L (ref 20–29)
CREAT SERPL-MCNC: 1.05 MG/DL (ref 0.57–1)
EGFRCR SERPLBLD CKD-EPI 2021: 55 ML/MIN/1.73
GLUCOSE SERPL-MCNC: 81 MG/DL (ref 70–99)
POTASSIUM SERPL-SCNC: 5.4 MMOL/L (ref 3.5–5.2)
SODIUM SERPL-SCNC: 140 MMOL/L (ref 134–144)

## 2024-12-06 NOTE — PROGRESS NOTES
[facility] - PODIATRY    Today's Date: 12/11/2024     Patient Name: Yvrose Ingram  MRN: [id number]  CSN: [id number]  PCP: Zuleyka Sagastume APRN  Referring Provider: No ref. provider found    SUBJECTIVE     Chief Complaint   Patient presents with    Follow-up     Zuleyka Sagastume 05/21/2024- pt states  feet doing good- pt denies pain     Diabetes     HPI: Yvrose Ingram, a 79 y.o.female, comes to clinic as a(n) established  patient presenting for diabetic foot exam and complaining of toenail/callus issues. Patient has h/o CHF, type II DM, hyperlipidemia, hypertension . Patient is IDDM and unsure of last BG level. Last A1C was around 7.1%.  Today complains that her toenails are elongated,  thickened and in need of care.  Patient denies any numbness or tingling in her feet.  [facility] Relays area is completely healed however will occasionally get red. Denies open area, drainage, or other SOI. Does complain of swelling to her BLEs regularly. Typically takes a diuretic over the weekends and keeps her lower extremities elevated while at rest. States she knows this issue is unrelated to her feet, however reports skin lesion to ear that continues to reoccur. Notes the area will scab over then she will then pick the scab off. Has never fully healed and has been present now for a few months. Denies drainage.   Denies pain currently. Relates previous treatment(s) including care by podiatrist in [place] . Denies any constitutional symptoms. No other pedal complaints at this time.    Past Medical History:   Diagnosis Date    CHF (congestive heart failure)     Diabetes mellitus     Hyperlipidemia     Hypertension      History reviewed. No pertinent surgical history.  Family History   Problem Relation Age of Onset    No Known Problems Mother     No Known Problems Father      Social History     Socioeconomic History    Marital status:    Tobacco Use     Smoking status: Never     Passive exposure: Never    Smokeless tobacco: Never   Vaping Use    Vaping status: Never Used   Substance and Sexual Activity    Alcohol use: Never    Drug use: Never    Sexual activity: Not Currently     Allergies   Allergen Reactions    Penicillins Rash     Current Outpatient Medications   Medication Sig Dispense Refill    aspirin (Aspirin Adult Low Dose) 81 MG EC tablet Take 1 tablet every 3 weeks by oral route.      atorvastatin (LIPITOR) 40 MG tablet Take 1 tablet by mouth every night at bedtime.      Azelastine HCl 137 MCG/SPRAY solution TAKE SPRAY NASAL EVERY DAY      Calcium Carbonate-Vit D-Min (CALTRATE PLUS PO) Take  by mouth.      Cyanocobalamin (B-12) 1000 MCG tablet Take  by mouth.      dapagliflozin Propanediol (Farxiga) 10 MG tablet Take  by mouth.      Entresto 49-51 MG tablet TAKE 1 TABLET BY MOUTH TWICE A DAY. REPLACES LOSARTAN/HCTZ. MONITOR HOME BLOOD PRESSURE. 60 tablet 1    estradiol (ESTRACE) 0.1 MG/GM vaginal cream INSERT 0.5 GRAMS 3 TIMES A WEEK BY VAGINAL ROUTE AT BEDTIME.      furosemide (LASIX) 20 MG tablet Take 1 tablet by mouth 2 (Two) Times a Day.      meloxicam (MOBIC) 7.5 MG tablet Take 1 tablet by mouth Daily.      metFORMIN (GLUCOPHAGE) 1000 MG tablet Take 1 tablet by mouth 2 (Two) Times a Day.      metoprolol succinate XL (TOPROL-XL) 25 MG 24 hr tablet Take 1 tablet by mouth Daily.      mupirocin (BACTROBAN) 2 % ointment Apply 1 Application topically to the appropriate area as directed 2 (Two) Times a Day. 22 g 1    NON FORMULARY Balance of Nature OTC      Radha, yfgn, 100 UNIT/ML solution pen-injector Inject 25 Units under the skin into the appropriate area as directed Daily. 12 mL 1    spironolactone (ALDACTONE) 25 MG tablet Take 1 tablet by mouth Daily.       No current facility-administered medications for this visit.     Review of Systems   Constitutional:  Negative for activity change and fever.   HENT:  Negative for congestion.    Respiratory:   Negative for chest tightness and shortness of breath.    Cardiovascular:  Positive for leg swelling. Negative for chest pain.   Gastrointestinal:  Negative for abdominal pain, constipation, diarrhea, nausea and vomiting.   Musculoskeletal:  Negative for arthralgias and gait problem.   Skin:  Positive for color change and wound.        Wound to left foot.  Occasional drainage noted.  Thickened, elongated toenails   Neurological:  Positive for numbness. Negative for dizziness and weakness.   Psychiatric/Behavioral:  Negative for agitation, behavioral problems and confusion.        OBJECTIVE     Vitals:    24 1455   BP: 126/66           PHYSICAL EXAM  GEN:   Accompanied by none.     Foot/Ankle Exam    GENERAL  Diabetic foot exam performed    Appearance:  appears stated age  Orientation:  AAOx3  Affect:  appropriate  Gait:  unimpaired  Assistance:  independent  Right shoe gear: sandal  Left shoe gear: sandal    VASCULAR     Right Foot Vascularity   Dorsalis pedis:  2+  Posterior tibial:  2+  Skin temperature:  warm  Edema gradin+ and pitting  CFT:  3  Pedal hair growth:  Present     Left Foot Vascularity   Dorsalis pedis:  2+  Posterior tibial:  2+  Skin temperature:  warm  Edema gradin+ and pitting  CFT:  3  Pedal hair growth:  Present     NEUROLOGIC     Right Foot Neurologic   Light touch sensation: diminished  Vibratory sensation: diminished  Hot/Cold sensation: diminished  Protective Sensation using [place]-Radha Monofilament:   Sites intact: 9  Sites tested: 10     Left Foot Neurologic   Light touch sensation: diminished  Vibratory sensation: diminished  Hot/Cold sensation:  diminished  Protective Sensation using [place]-Radha Monofilament:   Sites intact: 8  Sites tested: 10    MUSCULOSKELETAL     Right Foot Musculoskeletal   Ecchymosis:  none  Tenderness:  toenail problem    Arch:  Normal     Left Foot Musculoskeletal   Ecchymosis:  none  Tenderness:  lateral foot tenderness and toenail  problem  Arch:  Normal    MUSCLE STRENGTH     Right Foot Muscle Strength   Foot dorsiflexion:  5  Foot plantar flexion:  5  Foot inversion:  5  Foot eversion:  5     Left Foot Muscle Strength   Foot dorsiflexion:  5  Foot plantar flexion:  5  Foot inversion:  5  Foot eversion:  5    RANGE OF MOTION     Right Foot Range of Motion   Foot and ankle ROM within normal limits       Left Foot Range of Motion   Foot and ankle ROM within normal limits      DERMATOLOGIC      Right Foot Dermatologic   Skin  Positive for atrophy.   Nails  1.  Positive for onychomycosis, abnormal thickness, subungual debris and dystrophic nail.  2.  Positive for elongated and abnormal thickness.  3.  Positive for elongated and abnormal thickness.  4.  Positive for elongated and abnormal thickness.  5.  Positive for elongated and abnormal thickness.     Left Foot Dermatologic   Skin  Positive for corn and atrophy. Negative for ulcer.   Nails  1.  Positive for onychomycosis, abnormal thickness, subungual debris and dystrophic nail.  2.  Positive for elongated and abnormal thickness.  3.  Positive for elongated and abnormal thickness.  4.  Positive for elongated and abnormally thick.  5.  Positive for elongated and abnormally thick.     Left foot additional comments: Wound to lateral foot resolved.     Image:       RADIOLOGY/NUCLEAR:  No results found.    LABORATORY/CULTURE RESULTS:      PATHOLOGY RESULTS:       ASSESSMENT/PLAN     Diagnoses and all orders for this visit:    1. Thickened nails (Primary)    2. Left foot pain    3. Type 2 diabetes mellitus without complication, with long-term current use of insulin    4. Bilateral lower extremity edema    5. Skin lesion of left ear    6. Foot callus          Comprehensive lower extremity examination and evaluation was performed.  Discussed findings and treatment plan including risks, benefits, and treatment options with patient in detail. Patient agreed with treatment plan.  After verbal consent  obtained, nail(s) x10 debrided of length and thickness with nail nipper without incidence  After verbal consent obtained, calluses x1 pared utilizing dermal curette and/or scalpel without incidence  Patient may maintain nails and calluses at home utilizing emery board or pumice stone between visits as needed  Reviewed at home diabetic foot care including daily foot checks   Control and management of type II DM to be continued per PCP.  Recommend elevation of lower extremities while at rest and compression stockings for swelling.   For skin lesion to ear- instructed patient to use antibiotic ointment and bandage to the area. Instructed patient to follow-up on this issue with her PCP as she may want her to be evaluated by dermatology if unable to clear area.  Area does have a scab on it today but it does not have any redness, drainage, warmth or other active SOI currently.  Next appointment scheduled for 3 months.    An After Visit Summary was printed and given to the patient at discharge, including (if requested) any available informative/educational handouts regarding diagnosis, treatment, or medications. All questions were answered to patient/family satisfaction. Should symptoms fail to improve or worsen they agree to call or return to clinic or to go to the Emergency Department. Discussed the importance of following up with any needed screening tests/labs/specialist appointments and any requested follow-up recommended by me today. Importance of maintaining follow-up discussed and patient accepts that missed appointments can delay diagnosis and potentially lead to worsening of conditions.  Return in about 3 months (around 3/11/2025) for Follow-up with APRN, Follow-up in [facility] or sooner if acute issues arise.  Advance Care Planning   ACP discussion was declined by the patient. Patient does not have an advance directive, declines further assistance.         This document has been electronically signed by  Peggy Franco, APRN on December 11, 2024 15:43 CST

## 2024-12-11 ENCOUNTER — OFFICE VISIT (OUTPATIENT)
Age: 79
End: 2024-12-11
Payer: MEDICARE

## 2024-12-11 VITALS
WEIGHT: 141 LBS | HEIGHT: 66 IN | DIASTOLIC BLOOD PRESSURE: 66 MMHG | BODY MASS INDEX: 22.66 KG/M2 | SYSTOLIC BLOOD PRESSURE: 126 MMHG

## 2024-12-11 DIAGNOSIS — L60.2 THICKENED NAILS: Primary | ICD-10-CM

## 2024-12-11 DIAGNOSIS — Z79.4 TYPE 2 DIABETES MELLITUS WITHOUT COMPLICATION, WITH LONG-TERM CURRENT USE OF INSULIN: ICD-10-CM

## 2024-12-11 DIAGNOSIS — E11.9 TYPE 2 DIABETES MELLITUS WITHOUT COMPLICATION, WITH LONG-TERM CURRENT USE OF INSULIN: ICD-10-CM

## 2024-12-11 DIAGNOSIS — R60.0 BILATERAL LOWER EXTREMITY EDEMA: ICD-10-CM

## 2024-12-11 DIAGNOSIS — M79.672 LEFT FOOT PAIN: ICD-10-CM

## 2024-12-11 DIAGNOSIS — L84 FOOT CALLUS: ICD-10-CM

## 2024-12-11 DIAGNOSIS — H61.92 SKIN LESION OF LEFT EAR: ICD-10-CM

## 2024-12-19 RX ORDER — INSULIN ASPART 100 [IU]/ML
INJECTION, SOLUTION INTRAVENOUS; SUBCUTANEOUS
Qty: 50 ML | Refills: 3 | Status: SHIPPED | OUTPATIENT
Start: 2024-12-19

## 2024-12-20 RX ORDER — INSULIN ASPART 100 [IU]/ML
8 INJECTION, SOLUTION INTRAVENOUS; SUBCUTANEOUS
Qty: 3 ML | Refills: 1 | Status: SHIPPED | OUTPATIENT
Start: 2024-12-20

## 2024-12-30 RX ORDER — INSULIN ASPART 100 [IU]/ML
INJECTION, SOLUTION INTRAVENOUS; SUBCUTANEOUS
Qty: 30 ML | Refills: 12 | Status: SHIPPED | OUTPATIENT
Start: 2024-12-30

## 2025-01-02 ENCOUNTER — OFFICE VISIT (OUTPATIENT)
Dept: INTERNAL MEDICINE | Facility: CLINIC | Age: 80
End: 2025-01-02
Payer: MEDICARE

## 2025-01-02 VITALS
BODY MASS INDEX: 22.34 KG/M2 | SYSTOLIC BLOOD PRESSURE: 116 MMHG | OXYGEN SATURATION: 97 % | DIASTOLIC BLOOD PRESSURE: 74 MMHG | HEART RATE: 88 BPM | TEMPERATURE: 98.2 F | RESPIRATION RATE: 19 BRPM | WEIGHT: 139 LBS | HEIGHT: 66 IN

## 2025-01-02 DIAGNOSIS — G43.911 INTRACTABLE MIGRAINE WITH STATUS MIGRAINOSUS, UNSPECIFIED MIGRAINE TYPE: Primary | ICD-10-CM

## 2025-01-02 DIAGNOSIS — H60.502 ACUTE OTITIS EXTERNA OF LEFT EAR, UNSPECIFIED TYPE: ICD-10-CM

## 2025-01-02 PROCEDURE — 99213 OFFICE O/P EST LOW 20 MIN: CPT | Performed by: NURSE PRACTITIONER

## 2025-01-02 RX ORDER — PREDNISONE 20 MG/1
20 TABLET ORAL DAILY
Qty: 7 TABLET | Refills: 0 | Status: SHIPPED | OUTPATIENT
Start: 2025-01-02

## 2025-01-02 RX ORDER — NEOMYCIN SULFATE, POLYMYXIN B SULFATE, HYDROCORTISONE 3.5; 10000; 1 MG/ML; [USP'U]/ML; MG/ML
3 SOLUTION/ DROPS AURICULAR (OTIC) 4 TIMES DAILY
Qty: 10 ML | Refills: 0 | Status: SHIPPED | OUTPATIENT
Start: 2025-01-02

## 2025-01-02 NOTE — PROGRESS NOTES
Subjective     Chief Complaint   Patient presents with    Migraine    Earache       History of Present Illness  The patient is a 79-year-old female who presents for evaluation of left ear irritation and migraine.    She has been experiencing persistent irritation in her left ear for over 2 years, which she describes as more frustrating than painful. The symptoms include a sensation of blockage, altered voice, and a perception of yelling. She reports no sinus pain or sore throat but mentions constant nasal drainage. She was prescribed a nasal spray by Dr. Maria, which initially provided relief, but the symptoms have recurred in the past month.    She experienced a severe migraine on Christmas Eve, accompanied by vomiting from 8:30 PM to 2:00 AM. She sought emergency care at [facility], where she was treated with medication that successfully stopped the vomiting. However, she continues to experience headaches. She has an upcoming appointment with an ophthalmologist at [facility] due to difficulty wearing her glasses. She has a history of migraines but has not experienced one in several years. She attributes her current symptoms to significant stress and exhaustion following the recent deaths of her brother and nephew. She also reports visual disturbances and dizziness, which she believes are related to her eye condition. She has been using a walker frequently due to a fear of falling.    FAMILY HISTORY  Her brother had cancer and heart problems and passed away on 12/13/2024. Her nephew, aged 56, passed away on 12/17/2024, and the autopsy is incomplete.            Otherwise complete ROS reviewed and negative except as mentioned in the HPI.    Past Medical History:   Past Medical History:   Diagnosis Date    CHF (congestive heart failure)     Diabetes mellitus     Hyperlipidemia     Hypertension      Past Surgical History:No past surgical history on file.  Social History:  reports that she has never smoked. She has  never been exposed to tobacco smoke. She has never used smokeless tobacco. She reports that she does not drink alcohol and does not use drugs.    Family History: family history includes No Known Problems in her father and mother.       Allergies:  Allergies   Allergen Reactions    Penicillins Rash     Medications:  Prior to Admission medications    Medication Sig Start Date End Date Taking? Authorizing Provider   aspirin (Aspirin Adult Low Dose) 81 MG EC tablet Take 1 tablet every 3 weeks by oral route.    Akash Rendon MD   atorvastatin (LIPITOR) 40 MG tablet Take 1 tablet by mouth every night at bedtime.    Akash Rendon MD   Azelastine HCl 137 MCG/SPRAY solution TAKE SPRAY NASAL EVERY DAY 6/28/24   Akash Rendon MD   Calcium Carbonate-Vit D-Min (CALTRATE PLUS PO) Take  by mouth.    Akash Rendon MD   Cyanocobalamin (B-12) 1000 MCG tablet Take  by mouth.    Akash Rendon MD   dapagliflozin Propanediol (Farxiga) 10 MG tablet Take  by mouth.    Akash Rendon MD   Entresto 49-51 MG tablet TAKE 1 TABLET BY MOUTH TWICE A DAY. REPLACES LOSARTAN/HCTZ. MONITOR HOME BLOOD PRESSURE. 8/19/24   Zuleyka Sagastume APRN   estradiol (ESTRACE) 0.1 MG/GM vaginal cream INSERT 0.5 GRAMS 3 TIMES A WEEK BY VAGINAL ROUTE AT BEDTIME.    Akash Rendon MD   furosemide (LASIX) 20 MG tablet Take 1 tablet by mouth 2 (Two) Times a Day.    Akash Rendon MD   Insulin Aspart (novoLOG) 100 UNIT/ML injection INJECT 8 UNITS SUBCUATNEOUSLY WITH BREAKFAST, THEN 13 UNITS WITH LUNCH THEN INJECT 24 UNITS WITH SUPPER ADMINISTER 54 UNIT(S) SUBCUTANEOUS DAILY FILL 90 DAY SUPPLY 12/19/24   Zuleyka Sagastume APRN   Insulin Aspart (novoLOG) 100 UNIT/ML injection Take 8 units before breakfast, 12 before lunch and 24 units prior to supper. Total of 54 units per day. 12/30/24   Zuleyka Sagastume APRN   meloxicam (MOBIC) 7.5 MG tablet Take 1 tablet by mouth Daily.    Justice  "MD Akash   metFORMIN (GLUCOPHAGE) 1000 MG tablet Take 1 tablet by mouth 2 (Two) Times a Day.    Provider, MD Akash   metoprolol succinate XL (TOPROL-XL) 25 MG 24 hr tablet Take 1 tablet by mouth Daily.    ProviderAkash MD   mupirocin (BACTROBAN) 2 % ointment Apply 1 Application topically to the appropriate area as directed 2 (Two) Times a Day. 4/4/24   Zuleyka Sagastume APRN   NON FORMULARY Balance of Nature OTC    Provider, Historical, MD   Semglee, yfgn, 100 UNIT/ML solution pen-injector Inject 25 Units under the skin into the appropriate area as directed Daily. 8/13/24   Zuleyka Sagastume APRN   spironolactone (ALDACTONE) 25 MG tablet Take 1 tablet by mouth Daily. 7/16/24   Akash Rendon MD       Objective     Vital Signs: /74   Pulse 88   Temp 98.2 °F (36.8 °C)   Resp 19   Ht 167.6 cm (66\")   Wt 63 kg (139 lb)   SpO2 97%   BMI 22.44 kg/m²     Physical Exam  The right ear appears normal. The left ear canal is swollen, but the eardrum itself looks okay. Throat was examined.    Vital Signs  Blood pressure is 137.  Physical Exam  Vitals reviewed.   Constitutional:       Appearance: Normal appearance. She is well-developed.   HENT:      Head: Normocephalic and atraumatic.      Right Ear: Tympanic membrane normal.      Ears:      Comments: Ear canal is edematous  Eyes:      Pupils: Pupils are equal, round, and reactive to light.   Neck:      Vascular: No JVD.   Cardiovascular:      Rate and Rhythm: Normal rate and regular rhythm.   Pulmonary:      Effort: Pulmonary effort is normal.      Breath sounds: Normal breath sounds.   Abdominal:      General: Bowel sounds are normal.      Palpations: Abdomen is soft.   Musculoskeletal:         General: No deformity.      Cervical back: Normal range of motion and neck supple.   Lymphadenopathy:      Cervical: No cervical adenopathy.   Skin:     General: Skin is warm and dry.   Neurological:      Mental Status: She is alert " and oriented to person, place, and time.   Psychiatric:         Behavior: Behavior normal.         Thought Content: Thought content normal.         Judgment: Judgment normal.       BMI is within normal parameters. No other follow-up for BMI required.    Results Reviewed:  Glucose   Date Value Ref Range Status   11/19/2024 81 70 - 99 mg/dL Final     BUN   Date Value Ref Range Status   11/19/2024 35 (H) 8 - 27 mg/dL Final     Creatinine   Date Value Ref Range Status   11/19/2024 1.05 (H) 0.57 - 1.00 mg/dL Final     Sodium   Date Value Ref Range Status   11/19/2024 140 134 - 144 mmol/L Final     Potassium   Date Value Ref Range Status   11/19/2024 5.4 (H) 3.5 - 5.2 mmol/L Final     Chloride   Date Value Ref Range Status   11/19/2024 103 96 - 106 mmol/L Final     Total CO2   Date Value Ref Range Status   11/19/2024 25 20 - 29 mmol/L Final     Calcium   Date Value Ref Range Status   11/19/2024 9.6 8.7 - 10.3 mg/dL Final     ALT (SGPT)   Date Value Ref Range Status   11/12/2024 26 0 - 32 IU/L Final     AST (SGOT)   Date Value Ref Range Status   11/12/2024 34 0 - 40 IU/L Final     WBC   Date Value Ref Range Status   11/12/2024 5.6 3.4 - 10.8 x10E3/uL Final     Hematocrit   Date Value Ref Range Status   11/12/2024 35.4 34.0 - 46.6 % Final     Platelets   Date Value Ref Range Status   11/12/2024 235 150 - 450 x10E3/uL Final     Triglycerides   Date Value Ref Range Status   04/25/2024 95 0 - 149 mg/dL Final     HDL Cholesterol   Date Value Ref Range Status   04/25/2024 61 >39 mg/dL Final     LDL Chol Calc (NIH)   Date Value Ref Range Status   04/25/2024 48 0 - 99 mg/dL Final     Hemoglobin A1C   Date Value Ref Range Status   11/12/2024 7.1 (H) 4.8 - 5.6 % Final     Comment:              Prediabetes: 5.7 - 6.4           Diabetes: >6.4           Glycemic control for adults with diabetes: <7.0     08/13/2024 7.6 (A) 4.5 - 5.7 % Final       Results        Assessment / Plan     Assessment/Plan:  Diagnoses and all orders for this  visit:    1. Intractable migraine with status migrainosus, unspecified migraine type (Primary)  -     predniSONE (DELTASONE) 20 MG tablet; Take 1 tablet by mouth Daily.  Dispense: 7 tablet; Refill: 0    2. Acute otitis externa of left ear, unspecified type  -     predniSONE (DELTASONE) 20 MG tablet; Take 1 tablet by mouth Daily.  Dispense: 7 tablet; Refill: 0  -     neomycin-polymyxin-hydrocortisone (CORTISPORIN) 3.5-43011-1 otic solution; Administer 3 drops into the left ear 4 (Four) Times a Day.  Dispense: 10 mL; Refill: 0      Assessment & Plan  1. Left ear irritation.  The left ear canal is swollen, although the eardrum appears normal. She has been using a nasal spray for the past 2 years, which initially helped but has been less effective in the last month. A prescription for eardrops has been issued to alleviate the swelling. Additionally, a steroid regimen of 1 pill daily for 7 days has been prescribed to reduce inflammation and potentially improve sinus drainage. The prescriptions have been sent to [facility].    2. Migraine.  She experienced a severe migraine on Celia Eve, accompanied by vomiting. She has not had a migraine in years but has been under significant stress recently. The prescribed steroid regimen for her ear condition is also expected to help with her headaches. She is advised to monitor her blood sugar levels as steroids can elevate blood sugar.    No follow-ups on file. unless patient needs to be seen sooner or acute issues arise.    Code Status: Full.   Patient or patient representative verbalized consent for the use of Ambient Listening during the visit with  KARLA Roth for chart documentation. 1/2/2025  13:38 CST  I have discussed the patient results/orders and and plan/recommendation with them at today's visit.      Signed by:    KARLA Roth Date: 01/02/25

## 2025-01-03 DIAGNOSIS — E11.69 TYPE 2 DIABETES MELLITUS WITH OTHER SPECIFIED COMPLICATION, WITH LONG-TERM CURRENT USE OF INSULIN: ICD-10-CM

## 2025-01-03 DIAGNOSIS — Z79.4 TYPE 2 DIABETES MELLITUS WITH OTHER SPECIFIED COMPLICATION, WITH LONG-TERM CURRENT USE OF INSULIN: ICD-10-CM

## 2025-01-03 RX ORDER — INSULIN GLARGINE-YFGN 100 [IU]/ML
25 INJECTION, SOLUTION SUBCUTANEOUS DAILY
Refills: 1 | OUTPATIENT
Start: 2025-01-03

## 2025-01-03 NOTE — TELEPHONE ENCOUNTER
Rx Refill Note  Requested Prescriptions     Pending Prescriptions Disp Refills    Semglee, yfgn, 100 UNIT/ML solution pen-injector [Pharmacy Med Name: SEMGLEE (YFGN) 100 UNIT/ML PEN]  1     Sig: INJECT 25 UNITS UNDER THE SKIN INTO THE APPROPRIATE AREA AS DIRECTED DAILY.      Last office visit with prescribing clinician: 1/2/2025   Last telemedicine visit with prescribing clinician: Visit date not found   Next office visit with prescribing clinician: 2/11/2025                         Would you like a call back once the refill request has been completed: [] Yes [] No    If the office needs to give you a call back, can they leave a voicemail: [] Yes [] No    Valeria Davenport RN  01/03/25, 07:06 CST

## 2025-02-03 DIAGNOSIS — Z79.4 TYPE 2 DIABETES MELLITUS WITH OTHER SPECIFIED COMPLICATION, WITH LONG-TERM CURRENT USE OF INSULIN: ICD-10-CM

## 2025-02-03 DIAGNOSIS — E11.69 TYPE 2 DIABETES MELLITUS WITH OTHER SPECIFIED COMPLICATION, WITH LONG-TERM CURRENT USE OF INSULIN: ICD-10-CM

## 2025-02-03 RX ORDER — INSULIN GLARGINE-YFGN 100 [IU]/ML
25 INJECTION, SOLUTION SUBCUTANEOUS DAILY
Qty: 4 ML | Refills: 1 | Status: SHIPPED | OUTPATIENT
Start: 2025-02-03

## 2025-02-03 NOTE — TELEPHONE ENCOUNTER
Rx Refill Note  Requested Prescriptions     Pending Prescriptions Disp Refills    Semglee, yfgn, 100 UNIT/ML solution pen-injector [Pharmacy Med Name: SEMGLEE (YFGN) 100 UNIT/ML PEN]  1     Sig: INJECT 25 UNITS UNDER THE SKIN INTO THE APPROPRIATE AREA AS DIRECTED DAILY.      Last office visit with prescribing clinician: 1/2/2025   Last telemedicine visit with prescribing clinician: Visit date not found   Next office visit with prescribing clinician: 2/11/2025                         Would you like a call back once the refill request has been completed: [] Yes [] No    If the office needs to give you a call back, can they leave a voicemail: [] Yes [] No    Valeria Davenport RN  02/03/25, 09:19 CST

## 2025-02-11 ENCOUNTER — OFFICE VISIT (OUTPATIENT)
Dept: INTERNAL MEDICINE | Facility: CLINIC | Age: 80
End: 2025-02-11
Payer: MEDICARE

## 2025-02-11 VITALS
WEIGHT: 136 LBS | DIASTOLIC BLOOD PRESSURE: 65 MMHG | BODY MASS INDEX: 21.86 KG/M2 | HEIGHT: 66 IN | RESPIRATION RATE: 18 BRPM | HEART RATE: 84 BPM | SYSTOLIC BLOOD PRESSURE: 115 MMHG | OXYGEN SATURATION: 98 % | TEMPERATURE: 97.5 F

## 2025-02-11 DIAGNOSIS — E11.69 TYPE 2 DIABETES MELLITUS WITH OTHER SPECIFIED COMPLICATION, WITH LONG-TERM CURRENT USE OF INSULIN: Primary | ICD-10-CM

## 2025-02-11 DIAGNOSIS — Z79.4 TYPE 2 DIABETES MELLITUS WITH OTHER SPECIFIED COMPLICATION, WITH LONG-TERM CURRENT USE OF INSULIN: Primary | ICD-10-CM

## 2025-02-11 DIAGNOSIS — I10 PRIMARY HYPERTENSION: ICD-10-CM

## 2025-02-11 LAB
EXPIRATION DATE: ABNORMAL
HBA1C MFR BLD: 6.9 % (ref 4.5–5.7)
Lab: ABNORMAL

## 2025-02-11 PROCEDURE — 99213 OFFICE O/P EST LOW 20 MIN: CPT | Performed by: NURSE PRACTITIONER

## 2025-02-11 PROCEDURE — 3044F HG A1C LEVEL LT 7.0%: CPT | Performed by: NURSE PRACTITIONER

## 2025-02-11 PROCEDURE — 1160F RVW MEDS BY RX/DR IN RCRD: CPT | Performed by: NURSE PRACTITIONER

## 2025-02-11 PROCEDURE — 83036 HEMOGLOBIN GLYCOSYLATED A1C: CPT | Performed by: NURSE PRACTITIONER

## 2025-02-11 PROCEDURE — 1159F MED LIST DOCD IN RCRD: CPT | Performed by: NURSE PRACTITIONER

## 2025-02-11 NOTE — PROGRESS NOTES
Subjective     Chief Complaint   Patient presents with    Diabetes       History of Present Illness      ***  Patient's PMR from outside medical facility reviewed and noted.    ***  Otherwise complete ROS reviewed and negative except as mentioned in the HPI.    Past Medical History:   Past Medical History:   Diagnosis Date    CHF (congestive heart failure)     Diabetes mellitus     Hyperlipidemia     Hypertension      Past Surgical History:No past surgical history on file.  Social History:  reports that she has never smoked. She has never been exposed to tobacco smoke. She has never used smokeless tobacco. She reports that she does not drink alcohol and does not use drugs.    Family History: family history includes No Known Problems in her father and mother.   ***    Allergies:  Allergies   Allergen Reactions    Penicillins Rash     Medications:  Prior to Admission medications    Medication Sig Start Date End Date Taking? Authorizing Provider   aspirin (Aspirin Adult Low Dose) 81 MG EC tablet Take 1 tablet every 3 weeks by oral route.    Akash Rendon MD   atorvastatin (LIPITOR) 40 MG tablet Take 1 tablet by mouth every night at bedtime.    Akash Rendon MD   Azelastine HCl 137 MCG/SPRAY solution TAKE SPRAY NASAL EVERY DAY 6/28/24   Akash Rendon MD   Calcium Carbonate-Vit D-Min (CALTRATE PLUS PO) Take  by mouth.    Akash Rendon MD   Cyanocobalamin (B-12) 1000 MCG tablet Take  by mouth.    Akash Rendon MD   dapagliflozin Propanediol (Farxiga) 10 MG tablet Take  by mouth.    Akash Rendon MD   Entresto 49-51 MG tablet TAKE 1 TABLET BY MOUTH TWICE A DAY. REPLACES LOSARTAN/HCTZ. MONITOR HOME BLOOD PRESSURE. 8/19/24   Zuleyka Sagastume APRN   estradiol (ESTRACE) 0.1 MG/GM vaginal cream INSERT 0.5 GRAMS 3 TIMES A WEEK BY VAGINAL ROUTE AT BEDTIME.    Akash Rendon MD   furosemide (LASIX) 20 MG tablet Take 1 tablet by mouth 2 (Two) Times a Day.     "Akash Rendon MD   Insulin Aspart (novoLOG) 100 UNIT/ML injection INJECT 8 UNITS SUBCUATNEOUSLY WITH BREAKFAST, THEN 13 UNITS WITH LUNCH THEN INJECT 24 UNITS WITH SUPPER ADMINISTER 54 UNIT(S) SUBCUTANEOUS DAILY ***FILL 90 DAY SUPPLY*** 12/19/24   Zuleyka Sagastume APRN   Insulin Aspart (novoLOG) 100 UNIT/ML injection Take 8 units before breakfast, 12 before lunch and 24 units prior to supper. Total of 54 units per day. 12/30/24   Zuleyka Sagastume APRN   meloxicam (MOBIC) 7.5 MG tablet Take 1 tablet by mouth Daily.    Akash Rendon MD   metFORMIN (GLUCOPHAGE) 1000 MG tablet Take 1 tablet by mouth 2 (Two) Times a Day.    Akash Rendon MD   metoprolol succinate XL (TOPROL-XL) 25 MG 24 hr tablet Take 1 tablet by mouth Daily.    Akash Rendon MD   mupirocin (BACTROBAN) 2 % ointment Apply 1 Application topically to the appropriate area as directed 2 (Two) Times a Day. 4/4/24   Zuleyka Sagastume APRN   neomycin-polymyxin-hydrocortisone (CORTISPORIN) 3.5-74551-0 otic solution Administer 3 drops into the left ear 4 (Four) Times a Day. 1/2/25   Zuleyka Sagastume APRN   NON FORMULARY Balance of Nature OTC    Akash Rendon MD   predniSONE (DELTASONE) 20 MG tablet Take 1 tablet by mouth Daily. 1/2/25   Zuleyka Sagastume APRN   Semgleelucreciagn, 100 UNIT/ML solution pen-injector INJECT 25 UNITS UNDER THE SKIN INTO THE APPROPRIATE AREA AS DIRECTED DAILY. 2/3/25   Zuleyka Sagastume APRN   spironolactone (ALDACTONE) 25 MG tablet Take 1 tablet by mouth Daily. 7/16/24   Akash Rendon MD       Objective     Vital Signs: /65   Pulse 84   Temp 97.5 °F (36.4 °C)   Resp 18   Ht 167.6 cm (66\")   Wt 61.7 kg (136 lb)   SpO2 98%   BMI 21.95 kg/m²     Physical Exam      ***  BMI is within normal parameters. No other follow-up for BMI required.      Results Reviewed:  Glucose   Date Value Ref Range Status   11/19/2024 81 70 - 99 mg/dL Final "     BUN   Date Value Ref Range Status   11/19/2024 35 (H) 8 - 27 mg/dL Final     Creatinine   Date Value Ref Range Status   11/19/2024 1.05 (H) 0.57 - 1.00 mg/dL Final     Sodium   Date Value Ref Range Status   11/19/2024 140 134 - 144 mmol/L Final     Potassium   Date Value Ref Range Status   11/19/2024 5.4 (H) 3.5 - 5.2 mmol/L Final     Chloride   Date Value Ref Range Status   11/19/2024 103 96 - 106 mmol/L Final     Total CO2   Date Value Ref Range Status   11/19/2024 25 20 - 29 mmol/L Final     Calcium   Date Value Ref Range Status   11/19/2024 9.6 8.7 - 10.3 mg/dL Final     ALT (SGPT)   Date Value Ref Range Status   11/12/2024 26 0 - 32 IU/L Final     AST (SGOT)   Date Value Ref Range Status   11/12/2024 34 0 - 40 IU/L Final     WBC   Date Value Ref Range Status   11/12/2024 5.6 3.4 - 10.8 x10E3/uL Final     Hematocrit   Date Value Ref Range Status   11/12/2024 35.4 34.0 - 46.6 % Final     Platelets   Date Value Ref Range Status   11/12/2024 235 150 - 450 x10E3/uL Final     Triglycerides   Date Value Ref Range Status   04/25/2024 95 0 - 149 mg/dL Final     HDL Cholesterol   Date Value Ref Range Status   04/25/2024 61 >39 mg/dL Final     LDL Chol Calc (NIH)   Date Value Ref Range Status   04/25/2024 48 0 - 99 mg/dL Final     Hemoglobin A1C   Date Value Ref Range Status   11/12/2024 7.1 (H) 4.8 - 5.6 % Final     Comment:              Prediabetes: 5.7 - 6.4           Diabetes: >6.4           Glycemic control for adults with diabetes: <7.0     08/13/2024 7.6 (A) 4.5 - 5.7 % Final       Results        Assessment / Plan     Assessment/Plan:    Assessment & Plan        {Time Spent (Optional):80433}    No follow-ups on file. unless patient needs to be seen sooner or acute issues arise.    Code Status: ***  {[facility] [employer] Provider Statement:66926}  I have discussed the patient results/orders and and plan/recommendation with them at today's visit.      Signed by:    Zuleyka Sagastume, KARLA Date: 02/11/25

## 2025-02-11 NOTE — PROGRESS NOTES
"        Subjective     Chief Complaint   Patient presents with    Diabetes       History of Present Illness    Pt present for diabetic follow up, states she \"overall feels good\". Pt is currently taking metformin BID, semglee daily, and Novalog with lunch and dinner as prescribed. Pt states her blood sugar usually ranges from \"140-200\" around dinner time when she checks it. Pt was on steroids last month and is worried about her A1C being elevated. Pt admits to a poor diet with high carb intake, but \"tries to stay away from salty and fatty food\". Pt has lost 3 lbs since last visit. Pt denies chest pain/SOB. Pt states she recently went to the ER due to a bruise on her L thigh she was worried was a blood clot. States she were able to do an US and confirm there was no clot. She also states the wound on her L foot has improved and scabs every once and a while. Denies any chest pain or shortness of breath.     Patient's PMR from outside medical facility reviewed and noted.      Otherwise complete ROS reviewed and negative except as mentioned in the HPI.    Past Medical History:   Past Medical History:   Diagnosis Date    CHF (congestive heart failure)     Diabetes mellitus     Hyperlipidemia     Hypertension      Past Surgical History:History reviewed. No pertinent surgical history.  Social History:  reports that she has never smoked. She has never been exposed to tobacco smoke. She has never used smokeless tobacco. She reports that she does not drink alcohol and does not use drugs.    Family History: family history includes No Known Problems in her father and mother.       Allergies:  Allergies   Allergen Reactions    Penicillins Rash     Medications:  Prior to Admission medications    Medication Sig Start Date End Date Taking? Authorizing Provider   aspirin (Aspirin Adult Low Dose) 81 MG EC tablet Take 1 tablet every 3 weeks by oral route.    Provider, MD Akash   atorvastatin (LIPITOR) 40 MG tablet Take 1 tablet by " mouth every night at bedtime.    Akash Rendon MD   Azelastine HCl 137 MCG/SPRAY solution TAKE SPRAY NASAL EVERY DAY 6/28/24   Akash Rendon MD   Calcium Carbonate-Vit D-Min (CALTRATE PLUS PO) Take  by mouth.    Akash Rendon MD   Cyanocobalamin (B-12) 1000 MCG tablet Take  by mouth.    Akash Rendon MD   dapagliflozin Propanediol (Farxiga) 10 MG tablet Take  by mouth.    Akash Rendon MD   Entresto 49-51 MG tablet TAKE 1 TABLET BY MOUTH TWICE A DAY. REPLACES LOSARTAN/HCTZ. MONITOR HOME BLOOD PRESSURE. 8/19/24   Zuleyka Sagastume APRN   estradiol (ESTRACE) 0.1 MG/GM vaginal cream INSERT 0.5 GRAMS 3 TIMES A WEEK BY VAGINAL ROUTE AT BEDTIME.    Akash Rendon MD   furosemide (LASIX) 20 MG tablet Take 1 tablet by mouth 2 (Two) Times a Day.    Akash Rendon MD   Insulin Aspart (novoLOG) 100 UNIT/ML injection INJECT 8 UNITS SUBCUATNEOUSLY WITH BREAKFAST, THEN 13 UNITS WITH LUNCH THEN INJECT 24 UNITS WITH SUPPER ADMINISTER 54 UNIT(S) SUBCUTANEOUS DAILY FILL 90 DAY SUPPLy 12/19/24   Zuleyka Sagastume APRN   Insulin Aspart (novoLOG) 100 UNIT/ML injection Take 8 units before breakfast, 12 before lunch and 24 units prior to supper. Total of 54 units per day. 12/30/24   Zuleyka Sagastume APRN   meloxicam (MOBIC) 7.5 MG tablet Take 1 tablet by mouth Daily.    Akash Rendon MD   metFORMIN (GLUCOPHAGE) 1000 MG tablet Take 1 tablet by mouth 2 (Two) Times a Day.    Akash Rendon MD   metoprolol succinate XL (TOPROL-XL) 25 MG 24 hr tablet Take 1 tablet by mouth Daily.    Akash Rendon MD   mupirocin (BACTROBAN) 2 % ointment Apply 1 Application topically to the appropriate area as directed 2 (Two) Times a Day. 4/4/24   Zuleyka Saagstume APRN   neomycin-polymyxin-hydrocortisone (CORTISPORIN) 3.5-09121-8 otic solution Administer 3 drops into the left ear 4 (Four) Times a Day. 1/2/25   Zuleyka Sagastume APRN   NON FORMULARY  "Balance of Nature OTC    Provider, MD Akash   predniSONE (DELTASONE) 20 MG tablet Take 1 tablet by mouth Daily. 1/2/25   Zuleyka Sagastume APRN   Semglee lucreciagn, 100 UNIT/ML solution pen-injector INJECT 25 UNITS UNDER THE SKIN INTO THE APPROPRIATE AREA AS DIRECTED DAILY. 2/3/25   Zuleyka Sagastume APRN   spironolactone (ALDACTONE) 25 MG tablet Take 1 tablet by mouth Daily. 7/16/24   ProviderAkash MD       Objective     Vital Signs: /65   Pulse 84   Temp 97.5 °F (36.4 °C)   Resp 18   Ht 167.6 cm (66\")   Wt 61.7 kg (136 lb)   SpO2 98%   BMI 21.95 kg/m²     Physical Exam    Physical Exam  Constitutional:       Appearance: She is well-developed.   HENT:      Head: Normocephalic and atraumatic.      Right Ear: Tympanic membrane normal.      Left Ear: Tympanic membrane normal.   Eyes:      Conjunctiva/sclera: Conjunctivae normal.      Pupils: Pupils are equal, round, and reactive to light.   Neck:      Vascular: No JVD.   Cardiovascular:      Rate and Rhythm: Normal rate. Rhythm irregular.      Heart sounds: Normal heart sounds. No murmur heard.     No friction rub. No gallop.   Pulmonary:      Effort: Pulmonary effort is normal. No respiratory distress.      Breath sounds: Normal breath sounds. No wheezing or rales.   Chest:      Chest wall: No tenderness.   Abdominal:      General: Bowel sounds are normal. There is no distension.      Palpations: Abdomen is soft.      Tenderness: There is no abdominal tenderness. There is no guarding or rebound.   Musculoskeletal:         General: No tenderness or deformity. Normal range of motion.      Cervical back: Neck supple.   Skin:     General: Skin is warm and dry.      Findings: Bruising (L thigh) and lesion (L foot, red, closed) present. No rash.   Neurological:      Mental Status: She is alert and oriented to person, place, and time.      Cranial Nerves: No cranial nerve deficit.      Motor: No abnormal muscle tone.      Deep Tendon " Reflexes: Reflexes normal.   Psychiatric:         Behavior: Behavior normal.         Thought Content: Thought content normal.         Judgment: Judgment normal.       BMI is within normal parameters. No other follow-up for BMI required.      Results Reviewed:  Glucose   Date Value Ref Range Status   11/19/2024 81 70 - 99 mg/dL Final     BUN   Date Value Ref Range Status   11/19/2024 35 (H) 8 - 27 mg/dL Final     Creatinine   Date Value Ref Range Status   11/19/2024 1.05 (H) 0.57 - 1.00 mg/dL Final     Sodium   Date Value Ref Range Status   11/19/2024 140 134 - 144 mmol/L Final     Potassium   Date Value Ref Range Status   11/19/2024 5.4 (H) 3.5 - 5.2 mmol/L Final     Chloride   Date Value Ref Range Status   11/19/2024 103 96 - 106 mmol/L Final     Total CO2   Date Value Ref Range Status   11/19/2024 25 20 - 29 mmol/L Final     Calcium   Date Value Ref Range Status   11/19/2024 9.6 8.7 - 10.3 mg/dL Final     ALT (SGPT)   Date Value Ref Range Status   11/12/2024 26 0 - 32 IU/L Final     AST (SGOT)   Date Value Ref Range Status   11/12/2024 34 0 - 40 IU/L Final     WBC   Date Value Ref Range Status   11/12/2024 5.6 3.4 - 10.8 x10E3/uL Final     Hematocrit   Date Value Ref Range Status   11/12/2024 35.4 34.0 - 46.6 % Final     Platelets   Date Value Ref Range Status   11/12/2024 235 150 - 450 x10E3/uL Final     Triglycerides   Date Value Ref Range Status   04/25/2024 95 0 - 149 mg/dL Final     HDL Cholesterol   Date Value Ref Range Status   04/25/2024 61 >39 mg/dL Final     LDL Chol Calc (NIH)   Date Value Ref Range Status   04/25/2024 48 0 - 99 mg/dL Final     Hemoglobin A1C   Date Value Ref Range Status   11/12/2024 7.1 (H) 4.8 - 5.6 % Final     Comment:              Prediabetes: 5.7 - 6.4           Diabetes: >6.4           Glycemic control for adults with diabetes: <7.0     08/13/2024 7.6 (A) 4.5 - 5.7 % Final       Results        Assessment / Plan     Assessment/Plan:  Diagnoses and all orders for this visit:    1.  Type 2 diabetes mellitus with other specified complication, with long-term current use of insulin (Primary)  -     POC Glycosylated Hemoglobin (Hb A1C)    2. Primary hypertension     Blood pressure is stable on current regimen    A1c is 6.9 which is overall improved.  She is technically at goal.  For now we will leave her on her current insulin regimen.  Assessment & Plan            Return in about 3 months (around 5/11/2025). unless patient needs to be seen sooner or acute issues arise.      I have discussed the patient results/orders and and plan/recommendation with them at today's visit.      Signed by:    KARLA Roth Date: 02/11/25

## 2025-03-05 NOTE — PROGRESS NOTES
[facility] - PODIATRY    Today's Date: 03/12/2025     Patient Name: Yvrose Ingram  MRN: [id number]  CSN: [id number]  PCP: Zuleyka Sagastume APRN  Referring Provider: No ref. provider found    SUBJECTIVE     Chief Complaint   Patient presents with    Follow-up     Zuleyka Sagastume APRN 02/11/25   Return in about 3 months (around 3/11/2025) for Follow-up with KARLA, [facility].   Pt states she is here today for diabetic foot/nail care. Pt denies pain.     Diabetes     112 mg/dLbg      HPI: Yvrose Ingram, a 79 y.o.female, comes to clinic as a(n) established  patient presenting for diabetic foot exam and complaining of toenail/callus issues. Patient has h/o CHF, type II DM, hyperlipidemia, hypertension . Patient is IDDM with last stated BG level of 112mg/dl. Last A1C was around 6%. Notes toenails are elongated and thickened.   Patient denies any numbness or tingling in her feet.  No longer going to outpt [facility] for wound to left foot. Relays area is completely healed however has noticed over the last couple days it appears to have a small callus present. Denies open area, drainage, or other SOI. Admits to regular swelling to her BLEs regularly. Typically takes a diuretic over the weekends and keeps her lower extremities elevated while at rest.  Denies pain currently. Relates previous treatment(s) including care by podiatrist in [place] . Denies any constitutional symptoms. No other pedal complaints at this time.    Past Medical History:   Diagnosis Date    CHF (congestive heart failure)     Diabetes mellitus     Hyperlipidemia     Hypertension      History reviewed. No pertinent surgical history.  Family History   Problem Relation Age of Onset    No Known Problems Mother     No Known Problems Father      Social History     Socioeconomic History    Marital status:    Tobacco Use    Smoking status: Never     Passive exposure: Never    Smokeless tobacco: Never   Vaping  Use    Vaping status: Never Used   Substance and Sexual Activity    Alcohol use: Never    Drug use: Never    Sexual activity: Not Currently     Allergies   Allergen Reactions    Penicillins Rash         Review of Systems   Constitutional:  Negative for activity change and fever.   HENT:  Negative for congestion.    Respiratory:  Negative for chest tightness and shortness of breath.    Cardiovascular:  Positive for leg swelling. Negative for chest pain.   Gastrointestinal:  Negative for abdominal pain, constipation, diarrhea, nausea and vomiting.   Musculoskeletal:  Negative for arthralgias and gait problem.   Skin:  Negative for color change and wound.        Thickened, elongated toenails. callus   Neurological:  Positive for numbness. Negative for dizziness and weakness.   Psychiatric/Behavioral:  Negative for agitation, behavioral problems and confusion.        OBJECTIVE     Vitals:    25 1408   BP: 110/58             PHYSICAL EXAM  GEN:   Accompanied by none.     Foot/Ankle Exam    GENERAL  Diabetic foot exam performed    Appearance:  appears stated age  Orientation:  AAOx3  Affect:  appropriate  Gait:  unimpaired  Assistance:  independent  Right shoe gear: sandal  Left shoe gear: sandal    VASCULAR     Right Foot Vascularity   Dorsalis pedis:  2+  Posterior tibial:  2+  Skin temperature:  warm  Edema gradin+ and pitting  CFT:  3  Pedal hair growth:  Present     Left Foot Vascularity   Dorsalis pedis:  2+  Posterior tibial:  2+  Skin temperature:  warm  Edema gradin+ and pitting  CFT:  3  Pedal hair growth:  Present     NEUROLOGIC     Right Foot Neurologic   Light touch sensation: diminished  Vibratory sensation: diminished  Hot/Cold sensation: diminished  Protective Sensation using [place]-Radha Monofilament:   Sites intact: 9  Sites tested: 10     Left Foot Neurologic   Light touch sensation: diminished  Vibratory sensation: diminished  Hot/Cold sensation:  diminished  Protective Sensation  using [place]-Radha Monofilament:   Sites intact: 8  Sites tested: 10    MUSCULOSKELETAL     Right Foot Musculoskeletal   Ecchymosis:  none  Tenderness:  toenail problem    Arch:  Normal     Left Foot Musculoskeletal   Ecchymosis:  none  Tenderness:  lateral foot tenderness and toenail problem  Arch:  Normal    MUSCLE STRENGTH     Right Foot Muscle Strength   Foot dorsiflexion:  5  Foot plantar flexion:  5  Foot inversion:  5  Foot eversion:  5     Left Foot Muscle Strength   Foot dorsiflexion:  5  Foot plantar flexion:  5  Foot inversion:  5  Foot eversion:  5    RANGE OF MOTION     Right Foot Range of Motion   Foot and ankle ROM within normal limits       Left Foot Range of Motion   Foot and ankle ROM within normal limits      DERMATOLOGIC      Right Foot Dermatologic   Skin  Positive for atrophy.   Nails  1.  Positive for onychomycosis, abnormal thickness, subungual debris and dystrophic nail.  2.  Positive for elongated and abnormal thickness.  3.  Positive for elongated and abnormal thickness.  4.  Positive for elongated and abnormal thickness.  5.  Positive for elongated and abnormal thickness.     Left Foot Dermatologic   Skin  Positive for corn and atrophy. Negative for ulcer.   Nails  1.  Positive for onychomycosis, abnormal thickness, subungual debris and dystrophic nail.  2.  Positive for elongated and abnormal thickness.  3.  Positive for elongated and abnormal thickness.  4.  Positive for elongated and abnormally thick.  5.  Positive for elongated and abnormally thick.     Left foot additional comments: Wound to lateral foot resolved.     Image:       RADIOLOGY/NUCLEAR:  No results found.    LABORATORY/CULTURE RESULTS:      PATHOLOGY RESULTS:       ASSESSMENT/PLAN     Diagnoses and all orders for this visit:    1. Thickened nails (Primary)    2. Type 2 diabetes mellitus without complication, with long-term current use of insulin    3. Encounter for diabetic foot exam    4. Bilateral lower extremity  edema    5. Foot callus            Comprehensive lower extremity examination and evaluation was performed.  Discussed findings and treatment plan including risks, benefits, and treatment options with patient in detail. Patient agreed with treatment plan.  After verbal consent obtained, nail(s) x10 debrided of length and thickness with nail nipper without incidence  After verbal consent obtained, calluses x2 pared utilizing dermal curette and/or scalpel without incidence  Patient may maintain nails and calluses at home utilizing emery board or pumice stone between visits as needed  Reviewed at home diabetic foot care including daily foot checks   DFE performed today.   Last A1c for review 7.1%.   Control and management of type II DM to be continued per PCP.  Recommend elevation of lower extremities while at rest and compression stockings for swelling.   Next appointment scheduled for 3 months.    An After Visit Summary was printed and given to the patient at discharge, including (if requested) any available informative/educational handouts regarding diagnosis, treatment, or medications. All questions were answered to patient/family satisfaction. Should symptoms fail to improve or worsen they agree to call or return to clinic or to go to the Emergency Department. Discussed the importance of following up with any needed screening tests/labs/specialist appointments and any requested follow-up recommended by me today. Importance of maintaining follow-up discussed and patient accepts that missed appointments can delay diagnosis and potentially lead to worsening of conditions.  Return in about 3 months (around 6/12/2025) for Follow-up with APRN, [facility] in [facility] or sooner if acute issues arise.  Advance Care Planning   ACP discussion was declined by the patient. Patient does not have an advance directive, declines further assistance.       I spent 10 minutes caring for Yvrose on this date of service. This  time includes time spent by me in the following activities: preparing for the visit, reviewing tests, performing a medically appropriate examination and/or evaluation, counseling and educating the patient/family/caregiver, and documenting information in the medical record  I spent 5 minutes on the separately reported service of toenail and HPK debridement. This time is not included in the time used to support the E/M service also reported today.    This document has been electronically signed by KARLA Gutierrez on March 12, 2025 16:01 CDT

## 2025-03-12 ENCOUNTER — OFFICE VISIT (OUTPATIENT)
Age: 80
End: 2025-03-12
Payer: MEDICARE

## 2025-03-12 VITALS
BODY MASS INDEX: 21.86 KG/M2 | HEIGHT: 66 IN | WEIGHT: 136 LBS | SYSTOLIC BLOOD PRESSURE: 110 MMHG | DIASTOLIC BLOOD PRESSURE: 58 MMHG

## 2025-03-12 DIAGNOSIS — L84 FOOT CALLUS: ICD-10-CM

## 2025-03-12 DIAGNOSIS — E11.9 ENCOUNTER FOR DIABETIC FOOT EXAM: ICD-10-CM

## 2025-03-12 DIAGNOSIS — R60.0 BILATERAL LOWER EXTREMITY EDEMA: ICD-10-CM

## 2025-03-12 DIAGNOSIS — L60.2 THICKENED NAILS: Primary | ICD-10-CM

## 2025-03-12 DIAGNOSIS — Z79.4 TYPE 2 DIABETES MELLITUS WITHOUT COMPLICATION, WITH LONG-TERM CURRENT USE OF INSULIN: ICD-10-CM

## 2025-03-12 DIAGNOSIS — E11.9 TYPE 2 DIABETES MELLITUS WITHOUT COMPLICATION, WITH LONG-TERM CURRENT USE OF INSULIN: ICD-10-CM

## 2025-05-13 ENCOUNTER — OFFICE VISIT (OUTPATIENT)
Dept: INTERNAL MEDICINE | Facility: CLINIC | Age: 80
End: 2025-05-13
Payer: MEDICARE

## 2025-05-13 VITALS
OXYGEN SATURATION: 98 % | SYSTOLIC BLOOD PRESSURE: 129 MMHG | HEART RATE: 66 BPM | BODY MASS INDEX: 21.53 KG/M2 | TEMPERATURE: 97.5 F | WEIGHT: 134 LBS | HEIGHT: 66 IN | RESPIRATION RATE: 18 BRPM | DIASTOLIC BLOOD PRESSURE: 73 MMHG

## 2025-05-13 DIAGNOSIS — Z79.4 TYPE 2 DIABETES MELLITUS WITH OTHER SPECIFIED COMPLICATION, WITH LONG-TERM CURRENT USE OF INSULIN: Primary | ICD-10-CM

## 2025-05-13 DIAGNOSIS — E11.69 TYPE 2 DIABETES MELLITUS WITH OTHER SPECIFIED COMPLICATION, WITH LONG-TERM CURRENT USE OF INSULIN: Primary | ICD-10-CM

## 2025-05-13 DIAGNOSIS — E78.5 HYPERLIPIDEMIA, UNSPECIFIED HYPERLIPIDEMIA TYPE: ICD-10-CM

## 2025-05-13 DIAGNOSIS — J30.5 RHINITIS DUE FOOD: ICD-10-CM

## 2025-05-13 PROCEDURE — 1160F RVW MEDS BY RX/DR IN RCRD: CPT | Performed by: NURSE PRACTITIONER

## 2025-05-13 PROCEDURE — 99214 OFFICE O/P EST MOD 30 MIN: CPT | Performed by: NURSE PRACTITIONER

## 2025-05-13 PROCEDURE — 1159F MED LIST DOCD IN RCRD: CPT | Performed by: NURSE PRACTITIONER

## 2025-05-13 RX ORDER — LEVOCETIRIZINE DIHYDROCHLORIDE 5 MG/1
5 TABLET, FILM COATED ORAL EVERY EVENING
Qty: 30 TABLET | Refills: 1 | Status: SHIPPED | OUTPATIENT
Start: 2025-05-13

## 2025-05-13 NOTE — PROGRESS NOTES
Subjective     Chief Complaint   Patient presents with    Diabetes       History of Present Illness  The patient presents for evaluation of anxiety, rhinorrhea, diabetes mellitus.    She reports a pervasive sense of fear and stress, which she attributes to the onset of paranoia following her 's death over 2 years ago. This has led to a significant decrease in her daily activities, with most of her time spent seated in a recliner. She expresses a desire to regain her independence but currently relies on her children for transportation to appointments such as pedicures. She is able to perform necessary tasks such as grocery shopping and banking, and driving, although she notes a recent decline in her vision, which is not improved by glasses. Her sleep pattern varies, with some nights characterized by uninterrupted sleep from 9:30 PM to morning, while others are marked by frequent awakenings. She considers a night with 3 hours of sleep to be satisfactory. She also experiences back pain, both lower and upper, which necessitates rest and often results in short naps. Once the pain subsides, she is able to resume her activities.    She has been experiencing persistent rhinorrhea, which she suspects may be due to allergies. The symptom appears to worsen during meals. She also reports frequent sneezing episodes. Despite using a nasal spray for an extended period, she has not found it to be effective in alleviating her symptoms. She does not take any oral medication for this condition.    She is currently on Farxiga for her diabetes. She takes NovoLog, a fast-acting insulin, with doses of 8 units in the morning, 15 units at lunch, and 24 units at supper. She was previously taking Semglee, a long-acting insulin, at a dose of 22 units once a day. Additionally, she is on metformin.    She reports that her wound has healed, but the area remains red. Occasionally, a scab forms over the wound, which she recently  removed without exerting much force, resulting in soreness. She notes that the wound tends to improve when she wears sandals.    Otherwise complete ROS reviewed and negative except as mentioned in the HPI.    Past Medical History:   Past Medical History:   Diagnosis Date    CHF (congestive heart failure)     Diabetes mellitus     Hyperlipidemia     Hypertension      Past Surgical History:History reviewed. No pertinent surgical history.  Social History:  reports that she has never smoked. She has never been exposed to tobacco smoke. She has never used smokeless tobacco. She reports that she does not drink alcohol and does not use drugs.    Family History: family history includes No Known Problems in her father and mother.       Allergies:  Allergies   Allergen Reactions    Penicillins Rash     Medications:  Prior to Admission medications    Medication Sig Start Date End Date Taking? Authorizing Provider   aspirin (Aspirin Adult Low Dose) 81 MG EC tablet Take 1 tablet every 3 weeks by oral route.    Akash Rendon MD   atorvastatin (LIPITOR) 40 MG tablet Take 1 tablet by mouth every night at bedtime.    Akash Rendon MD   Azelastine HCl 137 MCG/SPRAY solution TAKE SPRAY NASAL EVERY DAY 6/28/24   Akash Rendon MD   Calcium Carbonate-Vit D-Min (CALTRATE PLUS PO) Take  by mouth.    Akash Rendon MD   Cyanocobalamin (B-12) 1000 MCG tablet Take  by mouth.    Akash Rendon MD   dapagliflozin Propanediol (Farxiga) 10 MG tablet Take  by mouth.    Akash Rendon MD   Entresto 49-51 MG tablet TAKE 1 TABLET BY MOUTH TWICE A DAY. REPLACES LOSARTAN/HCTZ. MONITOR HOME BLOOD PRESSURE. 8/19/24   Zuleyka Sagastume APRN   estradiol (ESTRACE) 0.1 MG/GM vaginal cream INSERT 0.5 GRAMS 3 TIMES A WEEK BY VAGINAL ROUTE AT BEDTIME.    Akash Rendon MD   furosemide (LASIX) 20 MG tablet Take 1 tablet by mouth 2 (Two) Times a Day.    Akash Rendon MD   Insulin Aspart (novoLOG)  "100 UNIT/ML injection INJECT 8 UNITS SUBCUATNEOUSLY WITH BREAKFAST, THEN 13 UNITS WITH LUNCH THEN INJECT 24 UNITS WITH SUPPER ADMINISTER 54 UNIT(S) SUBCUTANEOUS DAILY FILL 90 DAY SUPPLY 12/19/24   Zuleyka Sagastume APRN   Insulin Aspart (novoLOG) 100 UNIT/ML injection Take 8 units before breakfast, 12 before lunch and 24 units prior to supper. Total of 54 units per day. 12/30/24   Zuleyka Sagastume APRN   meloxicam (MOBIC) 7.5 MG tablet Take 1 tablet by mouth Daily.    ProviderAkash MD   metFORMIN (GLUCOPHAGE) 1000 MG tablet Take 1 tablet by mouth 2 (Two) Times a Day.    Akash Rendon MD   metoprolol succinate XL (TOPROL-XL) 25 MG 24 hr tablet Take 1 tablet by mouth Daily.    Akash Rendon MD   mupirocin (BACTROBAN) 2 % ointment Apply 1 Application topically to the appropriate area as directed 2 (Two) Times a Day. 4/4/24   Zuleyka Sagastume APRN   neomycin-polymyxin-hydrocortisone (CORTISPORIN) 3.5-90165-9 otic solution Administer 3 drops into the left ear 4 (Four) Times a Day. 1/2/25   Zuleyka Sagastume APRN   NON FORMULARY Balance of Nature OTC    ProviderAkash MD   predniSONE (DELTASONE) 20 MG tablet Take 1 tablet by mouth Daily. 1/2/25   Zuleyka Sagastume APRN   Semglee, yfgn, 100 UNIT/ML solution pen-injector INJECT 25 UNITS UNDER THE SKIN INTO THE APPROPRIATE AREA AS DIRECTED DAILY. 2/3/25   Zuleyka Sagastume APRN   spironolactone (ALDACTONE) 25 MG tablet Take 1 tablet by mouth Daily. 7/16/24   Akash Rendon MD       Objective     Vital Signs: /73   Pulse 66   Temp 97.5 °F (36.4 °C)   Resp 18   Ht 167.6 cm (66\")   Wt 60.8 kg (134 lb)   SpO2 98%   BMI 21.63 kg/m²     Physical Exam  General: No acute distress, appears well.  Extremities: Mild swelling noted, wound on extremity healed, another wound still red but not open.  Physical Exam  Vitals reviewed.   Constitutional:       Appearance: Normal appearance. She is " well-developed.   HENT:      Head: Normocephalic and atraumatic.   Eyes:      Pupils: Pupils are equal, round, and reactive to light.   Neck:      Vascular: No JVD.   Cardiovascular:      Rate and Rhythm: Normal rate and regular rhythm.   Pulmonary:      Effort: Pulmonary effort is normal.      Breath sounds: Normal breath sounds.   Abdominal:      General: Bowel sounds are normal.      Palpations: Abdomen is soft.   Musculoskeletal:         General: Swelling (mild RLE) present. No deformity.      Cervical back: Normal range of motion and neck supple.   Lymphadenopathy:      Cervical: No cervical adenopathy.   Skin:     General: Skin is warm and dry.   Neurological:      General: No focal deficit present.      Mental Status: She is alert and oriented to person, place, and time.   Psychiatric:         Behavior: Behavior normal.         Thought Content: Thought content normal.         Judgment: Judgment normal.     BMI is within normal parameters. No other follow-up for BMI required.    Results Reviewed:  Glucose   Date Value Ref Range Status   11/19/2024 81 70 - 99 mg/dL Final     BUN   Date Value Ref Range Status   11/19/2024 35 (H) 8 - 27 mg/dL Final     Creatinine   Date Value Ref Range Status   11/19/2024 1.05 (H) 0.57 - 1.00 mg/dL Final     Sodium   Date Value Ref Range Status   11/19/2024 140 134 - 144 mmol/L Final     Potassium   Date Value Ref Range Status   11/19/2024 5.4 (H) 3.5 - 5.2 mmol/L Final     Chloride   Date Value Ref Range Status   11/19/2024 103 96 - 106 mmol/L Final     Total CO2   Date Value Ref Range Status   11/19/2024 25 20 - 29 mmol/L Final     Calcium   Date Value Ref Range Status   11/19/2024 9.6 8.7 - 10.3 mg/dL Final     ALT (SGPT)   Date Value Ref Range Status   11/12/2024 26 0 - 32 IU/L Final     AST (SGOT)   Date Value Ref Range Status   11/12/2024 34 0 - 40 IU/L Final     WBC   Date Value Ref Range Status   11/12/2024 5.6 3.4 - 10.8 x10E3/uL Final     Hematocrit   Date Value Ref  Range Status   11/12/2024 35.4 34.0 - 46.6 % Final     Platelets   Date Value Ref Range Status   11/12/2024 235 150 - 450 x10E3/uL Final     Triglycerides   Date Value Ref Range Status   04/25/2024 95 0 - 149 mg/dL Final     HDL Cholesterol   Date Value Ref Range Status   04/25/2024 61 >39 mg/dL Final     LDL Chol Calc (NIH)   Date Value Ref Range Status   04/25/2024 48 0 - 99 mg/dL Final     Hemoglobin A1C   Date Value Ref Range Status   02/11/2025 6.9 (A) 4.5 - 5.7 % Final       Results        Assessment / Plan     Assessment/Plan:  Diagnoses and all orders for this visit:    1. Type 2 diabetes mellitus with other specified complication, with long-term current use of insulin (Primary)  -     Comprehensive Metabolic Panel  -     Hemoglobin A1c    2. Hyperlipidemia, unspecified hyperlipidemia type  -     Lipid Panel  -     Comprehensive Metabolic Panel    3. Rhinitis due food  -     levocetirizine (XYZAL) 5 MG tablet; Take 1 tablet by mouth Every Evening.  Dispense: 30 tablet; Refill: 1      Assessment & Plan  1. Anxiety.  - Her anxiety appears to be exacerbated by the trauma of her 's death.  - A referral to a counselor specializing in trauma responses will be made.  - The counselor's office is located at [facility], and she has been provided with a brochure containing the contact information.  - The patient will be contacted after discussing with the counselor.    2. Rhinorrhea.  - She will discontinue the use of her current nasal spray once it is depleted and refrain from refilling it.  - A prescription for Xyzal to be taken at night has been provided.  - Potential side effects, including dry mouth, have been discussed.  - She is advised to maintain adequate hydration.    3. Diabetes mellitus.  - She is currently on Farxiga, NovoLog (8 units in the morning, 15 units at lunch, and 24 units at supper), Semglee (25 units once a day), and metformin.  - Blood work will be conducted today to monitor  her cholesterol levels.  - Medication regimen reviewed and confirmed.  - No changes to current diabetes management plan.    4. Wound care.  - The wound on her foot is healing but remains red.  - She is advised to wear comfortable shoes that do not put pressure on the wound.  - If the wound does not improve, further evaluation will be necessary.  - Patient reports occasional scabbing and soreness.      Return in about 3 months (around 8/13/2025). unless patient needs to be seen sooner or acute issues arise.      Patient or patient representative verbalized consent for the use of Ambient Listening during the visit with  KARLA Roth for chart documentation. 5/13/2025  11:02 CDT    I have discussed the patient results/orders and and plan/recommendation with them at today's visit.      Signed by:    KARLA Roth Date: 05/13/25

## 2025-05-14 LAB
ALBUMIN SERPL-MCNC: 4.5 G/DL (ref 3.8–4.8)
ALP SERPL-CCNC: 76 IU/L (ref 44–121)
ALT SERPL-CCNC: 24 IU/L (ref 0–32)
AST SERPL-CCNC: 28 IU/L (ref 0–40)
BILIRUB SERPL-MCNC: 0.3 MG/DL (ref 0–1.2)
BUN SERPL-MCNC: 37 MG/DL (ref 8–27)
BUN/CREAT SERPL: 34 (ref 12–28)
CALCIUM SERPL-MCNC: 10.4 MG/DL (ref 8.7–10.3)
CHLORIDE SERPL-SCNC: 102 MMOL/L (ref 96–106)
CHOLEST SERPL-MCNC: 144 MG/DL (ref 100–199)
CO2 SERPL-SCNC: 24 MMOL/L (ref 20–29)
CREAT SERPL-MCNC: 1.08 MG/DL (ref 0.57–1)
EGFRCR SERPLBLD CKD-EPI 2021: 53 ML/MIN/1.73
GLOBULIN SER CALC-MCNC: 2.5 G/DL (ref 1.5–4.5)
GLUCOSE SERPL-MCNC: 77 MG/DL (ref 70–99)
HBA1C MFR BLD: 6.9 % (ref 4.8–5.6)
HDLC SERPL-MCNC: 61 MG/DL
LDLC SERPL CALC-MCNC: 64 MG/DL (ref 0–99)
POTASSIUM SERPL-SCNC: 5.2 MMOL/L (ref 3.5–5.2)
PROT SERPL-MCNC: 7 G/DL (ref 6–8.5)
SODIUM SERPL-SCNC: 142 MMOL/L (ref 134–144)
TRIGL SERPL-MCNC: 104 MG/DL (ref 0–149)
VLDLC SERPL CALC-MCNC: 19 MG/DL (ref 5–40)

## 2025-05-27 ENCOUNTER — OFFICE VISIT (OUTPATIENT)
Dept: INTERNAL MEDICINE | Facility: CLINIC | Age: 80
End: 2025-05-27
Payer: MEDICARE

## 2025-05-27 VITALS
HEIGHT: 66 IN | RESPIRATION RATE: 12 BRPM | OXYGEN SATURATION: 97 % | DIASTOLIC BLOOD PRESSURE: 71 MMHG | WEIGHT: 139.6 LBS | SYSTOLIC BLOOD PRESSURE: 121 MMHG | TEMPERATURE: 97.3 F | HEART RATE: 77 BPM | BODY MASS INDEX: 22.43 KG/M2

## 2025-05-27 DIAGNOSIS — Z00.00 MEDICARE ANNUAL WELLNESS VISIT, SUBSEQUENT: Primary | ICD-10-CM

## 2025-05-27 DIAGNOSIS — E11.69 TYPE 2 DIABETES MELLITUS WITH OTHER SPECIFIED COMPLICATION, WITH LONG-TERM CURRENT USE OF INSULIN: ICD-10-CM

## 2025-05-27 DIAGNOSIS — Z79.4 TYPE 2 DIABETES MELLITUS WITH OTHER SPECIFIED COMPLICATION, WITH LONG-TERM CURRENT USE OF INSULIN: ICD-10-CM

## 2025-05-27 NOTE — PROGRESS NOTES
Subjective   The ABCs of the Annual Wellness Visit  Medicare Wellness Visit      Yvrose Ingram is a 79 y.o. patient who presents for a Medicare Wellness Visit.    The following portions of the patient's history were reviewed and   updated as appropriate: allergies, current medications, past family history, past medical history, past social history, past surgical history, and problem list.    Compared to one year ago, the patient's physical   health is the same.  Compared to one year ago, the patient's mental   health is the same.    Recent Hospitalizations:  She was not admitted to the hospital during the last year.     Current Medical Providers:  Patient Care Team:  Zuleyka Sagastume APRN as PCP - General (Nurse Practitioner)        No opioid medication identified on active medication list. I have reviewed chart for other potential  high risk medication/s and harmful drug interactions in the elderly.      Aspirin is on active medication list. Aspirin use is indicated based on review of current medical condition/s. Pros and cons of this therapy have been discussed today. Benefits of this medication outweigh potential harm.  Patient has been encouraged to continue taking this medication.  .      There is no problem list on file for this patient.    Advance Care Planning Advance Directive is not on file.  ACP discussion was held with the patient during this visit. Patient does not have an advance directive, information provided.      Advanced Directives  I discussed with this patient the importance of advanced directives and planning ahead in the event that they are unable to make decisions on their own due to illness or incapacity.  We filled out a MOST form and discussed the different options including DNR, DNI, feeding tubes, Long term iv fluids and the use of antibiotics.  I discussed other options such as a health care surrogate, and or placing time limits on life saving measures should the need arise.  I  "discussed the importance of having suman discussions with their family/healthcare surrogate about their wishes so they are well known.   I spent 16 minutes discussing/counseling these issues with the patient and MOST form will be scanned into the chart, also advised the patient to keep a copy and also make sure local hospital services also have a copy on file.  Patient also advised if they have a copy to their healthcare surrogate if they have chosen one.  Lastly we also advised the patient that if they want to change their wishes at any time that a new MOST form can be drafted to accommodate any change         Objective   Vitals:    05/27/25 1258 05/27/25 1332   BP: 121/71    BP Location: Right arm    Patient Position: Sitting    Cuff Size: Small Adult    Pulse: 77    Resp: 12    Temp: 97.3 °F (36.3 °C)    TempSrc: Temporal    SpO2: (!) 84% 97%   Weight: 63.3 kg (139 lb 9.6 oz)    Height: 167.6 cm (66\")    PainSc: 0-No pain        Estimated body mass index is 22.53 kg/m² as calculated from the following:    Height as of this encounter: 167.6 cm (66\").    Weight as of this encounter: 63.3 kg (139 lb 9.6 oz).    BMI is within normal parameters. No other follow-up for BMI required.           Does the patient have evidence of cognitive impairment? No  Lab Results   Component Value Date    CHLPL 144 05/13/2025    TRIG 104 05/13/2025    HDL 61 05/13/2025    LDL 64 05/13/2025    VLDL 19 05/13/2025    HGBA1C 6.9 (H) 05/13/2025                                                                                               Health  Risk Assessment    Smoking Status:  Social History     Tobacco Use   Smoking Status Never    Passive exposure: Never   Smokeless Tobacco Never     Alcohol Consumption:  Social History     Substance and Sexual Activity   Alcohol Use Never       Fall Risk Screen  STEADI Fall Risk Assessment was completed, and patient is at MODERATE risk for falls. Assessment completed on:5/27/2025    Depression " Screening   Little interest or pleasure in doing things? Not at all   Feeling down, depressed, or hopeless? Several days   PHQ-2 Total Score 1      Annual medicare depression screening:   Between Myself and staff 6 minutes were spent doing depression screening and discussing results with patient     Health Habits and Functional and Cognitive Screenin/27/2025     1:06 PM   Functional & Cognitive Status   Do you have difficulty preparing food and eating? No   Do you have difficulty bathing yourself, getting dressed or grooming yourself? No   Do you have difficulty using the toilet? No   Do you have difficulty moving around from place to place? No   Do you have trouble with steps or getting out of a bed or a chair? Yes   Current Diet Diabetic Diet   Dental Exam Not up to date   Eye Exam Up to date   Exercise (times per week) 3 times per week   Current Exercises Include Walking;Yard Work;House Cleaning   Do you need help using the phone?  No   Are you deaf or do you have serious difficulty hearing?  No   Do you need help to go to places out of walking distance? No   Do you need help shopping? No   Do you need help preparing meals?  No   Do you need help with housework?  No   Do you need help with laundry? No   Do you need help taking your medications? No   Do you need help managing money? No   Do you ever drive or ride in a car without wearing a seat belt? No   Have you felt unusual stress, anger or loneliness in the last month? No   Who do you live with? Alone   If you need help, do you have trouble finding someone available to you? No   Have you been bothered in the last four weeks by sexual problems? No   Do you have difficulty concentrating, remembering or making decisions? No           Age-appropriate Screening Schedule:  Refer to the list below for future screening recommendations based on patient's age, sex and/or medical conditions. Orders for these recommended tests are listed in the plan section. The  patient has been provided with a written plan.    Health Maintenance List  Health Maintenance   Topic Date Due    TDAP/TD VACCINES (1 - Tdap) Never done    ZOSTER VACCINE (1 of 2) Never done    RSV Vaccine - Adults (1 - 1-dose 75+ series) Never done    COVID-19 Vaccine (4 - 2024-25 season) 09/01/2024    URINE MICROALBUMIN-CREATININE RATIO (uACR)  05/21/2025    INFLUENZA VACCINE  07/01/2025    HEMOGLOBIN A1C  11/13/2025    DIABETIC EYE EXAM  01/02/2026    DIABETIC FOOT EXAM  03/12/2026    LIPID PANEL  05/13/2026    ANNUAL WELLNESS VISIT  05/27/2026    DXA SCAN  07/25/2026    MAMMOGRAM  02/25/2027    HEPATITIS C SCREENING  Completed    Pneumococcal Vaccine 50+  Completed                                                                                                                                                CMS Preventative Services Quick Reference  Risk Factors Identified During Encounter  None Identified    The above risks/problems have been discussed with the patient.  Pertinent information has been shared with the patient in the After Visit Summary.  An After Visit Summary and PPPS were made available to the patient.    Follow Up:  Next Medicare Wellness visit to be scheduled in 1 year.     Diagnoses and all orders for this visit:    1. Medicare annual wellness visit, subsequent (Primary)    2. Type 2 diabetes mellitus with other specified complication, with long-term current use of insulin  -     Microalbumin / Creatinine Urine Ratio - Urine, Clean Catch      Follow Up:  No follow-ups on file.

## 2025-05-29 LAB
ALBUMIN/CREAT UR: 19 MG/G CREAT (ref 0–29)
CREAT UR-MCNC: 46.8 MG/DL
MICROALBUMIN UR-MCNC: 8.7 UG/ML

## 2025-06-03 DIAGNOSIS — Z79.4 TYPE 2 DIABETES MELLITUS WITH OTHER SPECIFIED COMPLICATION, WITH LONG-TERM CURRENT USE OF INSULIN: ICD-10-CM

## 2025-06-03 DIAGNOSIS — E11.69 TYPE 2 DIABETES MELLITUS WITH OTHER SPECIFIED COMPLICATION, WITH LONG-TERM CURRENT USE OF INSULIN: ICD-10-CM

## 2025-06-03 RX ORDER — INSULIN GLARGINE-YFGN 100 [IU]/ML
25 INJECTION, SOLUTION SUBCUTANEOUS DAILY
Qty: 15 ML | Refills: 1 | Status: SHIPPED | OUTPATIENT
Start: 2025-06-03

## 2025-06-10 NOTE — PROGRESS NOTES
[facility] - PODIATRY    Today's Date: 06/11/2025     Patient Name: Yvrose Ingram  MRN: [id number]  CSN: [id number]  PCP: Zuleyka Sagastume APRN  Referring Provider: No ref. provider found    SUBJECTIVE     Chief Complaint   Patient presents with    Follow-up     PCP: Zuleyka Sagastume APRN 05/27/25  Return in about 3 months (around 6/12/2025) for Follow-up with KARLA, [facility].   Pt states she is here today for diabetic foot/nail care. Pt denies pain.     Diabetes     HPI: Yvrose Ingram, a 79 y.o.female, comes to clinic as a(n) established  patient presenting for diabetic foot exam and complaining of toenail/callus issues. Patient has h/o CHF, type II DM, hyperlipidemia, hypertension. Patient is IDDM and unsure of last BG level. Last A1C was around 6.9%. Toenails are in need of care d/t length and thickness.  Patient denies any numbness or tingling in her feet.  Notes previous area to left foot that she did have a wound tends to callus still however does not necessarily bother her. Notes a few weeks ago the area become slightly tender but it only lasted a couple days. Denies open area, drainage, or other SOI. Admits to regular swelling to her BLEs regularly. Denies pain currently. Relates previous treatment(s) including care by podiatrist in [place]. Denies any constitutional symptoms. No other pedal complaints at this time.    Past Medical History:   Diagnosis Date    CHF (congestive heart failure)     Diabetes mellitus     Hyperlipidemia     Hypertension      History reviewed. No pertinent surgical history.  Family History   Problem Relation Age of Onset    No Known Problems Mother     No Known Problems Father      Social History     Socioeconomic History    Marital status:    Tobacco Use    Smoking status: Never     Passive exposure: Never    Smokeless tobacco: Never   Vaping Use    Vaping status: Never Used   Substance and Sexual Activity    Alcohol use:  Never    Drug use: Never    Sexual activity: Not Currently     Allergies   Allergen Reactions    Penicillins Rash         Review of Systems   Constitutional:  Negative for activity change and fever.   HENT:  Negative for congestion.    Respiratory:  Negative for chest tightness and shortness of breath.    Cardiovascular:  Positive for leg swelling. Negative for chest pain.   Gastrointestinal:  Negative for abdominal pain, constipation, diarrhea, nausea and vomiting.   Musculoskeletal:  Negative for arthralgias and gait problem.   Skin:  Negative for color change and wound.        Thickened, elongated toenails.    Neurological:  Positive for numbness. Negative for dizziness and weakness.   Psychiatric/Behavioral:  Negative for agitation, behavioral problems and confusion.        OBJECTIVE     Vitals:    25 1334   BP: 110/64   Pulse: 85   SpO2: 96%               PHYSICAL EXAM  GEN:   Accompanied by none.     Foot/Ankle Exam    GENERAL  Diabetic foot exam performed    Appearance:  appears stated age  Orientation:  AAOx3  Affect:  appropriate  Gait:  unimpaired  Assistance:  independent  Right shoe gear: sandal  Left shoe gear: sandal    VASCULAR     Right Foot Vascularity   Dorsalis pedis:  2+  Posterior tibial:  2+  Skin temperature:  warm  Edema gradin+ and pitting  CFT:  3  Pedal hair growth:  Present     Left Foot Vascularity   Dorsalis pedis:  2+  Posterior tibial:  2+  Skin temperature:  warm  Edema gradin+ and pitting  CFT:  3  Pedal hair growth:  Present     NEUROLOGIC     Right Foot Neurologic   Light touch sensation: diminished  Vibratory sensation: diminished  Hot/Cold sensation: diminished  Protective Sensation using Worthington-Radha Monofilament:   Sites intact: 9  Sites tested: 10     Left Foot Neurologic   Light touch sensation: diminished  Vibratory sensation: diminished  Hot/Cold sensation:  diminished  Protective Sensation using Worthington-Radha Monofilament:   Sites intact: 8  Sites  tested: 10    MUSCULOSKELETAL     Right Foot Musculoskeletal   Ecchymosis:  none  Tenderness:  toenail problem    Arch:  Normal     Left Foot Musculoskeletal   Ecchymosis:  none  Tenderness:  toenail problem  Arch:  Normal    MUSCLE STRENGTH     Right Foot Muscle Strength   Foot dorsiflexion:  5  Foot plantar flexion:  5  Foot inversion:  5  Foot eversion:  5     Left Foot Muscle Strength   Foot dorsiflexion:  5  Foot plantar flexion:  5  Foot inversion:  5  Foot eversion:  5    RANGE OF MOTION     Right Foot Range of Motion   Foot and ankle ROM within normal limits       Left Foot Range of Motion   Foot and ankle ROM within normal limits      DERMATOLOGIC      Right Foot Dermatologic   Skin  Positive for atrophy.   Nails  1.  Positive for onychomycosis, abnormal thickness, subungual debris and dystrophic nail.  2.  Positive for elongated and abnormal thickness.  3.  Positive for elongated and abnormal thickness.  4.  Positive for elongated and abnormal thickness.  5.  Positive for elongated and abnormal thickness.     Left Foot Dermatologic   Skin  Positive for corn and atrophy. Negative for ulcer.   Nails  1.  Positive for onychomycosis, abnormal thickness, subungual debris and dystrophic nail.  2.  Positive for elongated and abnormal thickness.  3.  Positive for elongated and abnormal thickness.  4.  Positive for elongated and abnormally thick.  5.  Positive for elongated and abnormally thick.    Image:       RADIOLOGY/NUCLEAR:  No results found.    LABORATORY/CULTURE RESULTS:      PATHOLOGY RESULTS:       ASSESSMENT/PLAN     Diagnoses and all orders for this visit:    1. Thickened nails (Primary)    2. Type 2 diabetes mellitus without complication, with long-term current use of insulin    3. Bilateral lower extremity edema    4. Foot callus        Comprehensive lower extremity examination and evaluation was performed.  Discussed findings and treatment plan including risks, benefits, and treatment options with  patient in detail. Patient agreed with treatment plan.  After verbal consent obtained, nail(s) x10 debrided of length and thickness with nail nipper without incidence  After verbal consent obtained, calluses x2 pared utilizing dermal curette and/or scalpel without incidence  Patient may maintain nails and calluses at home utilizing emery board or pumice stone between visits as needed  Reviewed at home diabetic foot care including daily foot checks   Last A1c for review 7.9%.   Reviewed PCPs note.  Control and management of type II DM to be continued per PCP.  Site of previous wound- area with hyperkeratotic tissue present. No open areas, erythema, drainage or SOI present. Not debrided. Recommend she keep a close eye to area and monitor for new wounds or SOI.  Recommend elevation of lower extremities while at rest and compression stockings for swelling.   Next appointment scheduled for 3 months.    An After Visit Summary was printed and given to the patient at discharge, including (if requested) any available informative/educational handouts regarding diagnosis, treatment, or medications. All questions were answered to patient/family satisfaction. Should symptoms fail to improve or worsen they agree to call or return to clinic or to go to the Emergency Department. Discussed the importance of following up with any needed screening tests/labs/specialist appointments and any requested follow-up recommended by me today. Importance of maintaining follow-up discussed and patient accepts that missed appointments can delay diagnosis and potentially lead to worsening of conditions.  Return in about 3 months (around 9/11/2025) for Follow-up with APRN, [facility] or sooner if acute issues arise.  Advance Care Planning   ACP discussion was declined by the patient. Patient does not have an advance directive, declines further assistance.       I spent 10 minutes caring for Yvrose on this date of service. This  time includes time spent by me in the following activities: preparing for the visit, reviewing tests, performing a medically appropriate examination and/or evaluation, counseling and educating the patient/family/caregiver, and documenting information in the medical record  I spent 5 minutes on the separately reported service of toenail and HPK debridement. This time is not included in the time used to support the E/M service also reported today.    This document has been electronically signed by KARLA Gutierrez on June 11, 2025 13:54 CDT

## 2025-06-11 ENCOUNTER — OFFICE VISIT (OUTPATIENT)
Age: 80
End: 2025-06-11
Payer: MEDICARE

## 2025-06-11 VITALS
HEART RATE: 85 BPM | OXYGEN SATURATION: 96 % | WEIGHT: 139 LBS | HEIGHT: 66 IN | DIASTOLIC BLOOD PRESSURE: 64 MMHG | SYSTOLIC BLOOD PRESSURE: 110 MMHG | BODY MASS INDEX: 22.34 KG/M2

## 2025-06-11 DIAGNOSIS — R60.0 BILATERAL LOWER EXTREMITY EDEMA: ICD-10-CM

## 2025-06-11 DIAGNOSIS — L84 FOOT CALLUS: ICD-10-CM

## 2025-06-11 DIAGNOSIS — L60.2 THICKENED NAILS: Primary | ICD-10-CM

## 2025-06-11 DIAGNOSIS — Z79.4 TYPE 2 DIABETES MELLITUS WITHOUT COMPLICATION, WITH LONG-TERM CURRENT USE OF INSULIN: ICD-10-CM

## 2025-06-11 DIAGNOSIS — E11.9 TYPE 2 DIABETES MELLITUS WITHOUT COMPLICATION, WITH LONG-TERM CURRENT USE OF INSULIN: ICD-10-CM

## 2025-06-20 DIAGNOSIS — Z79.4 TYPE 2 DIABETES MELLITUS WITH OTHER SPECIFIED COMPLICATION, WITH LONG-TERM CURRENT USE OF INSULIN: Primary | ICD-10-CM

## 2025-06-20 DIAGNOSIS — E11.69 TYPE 2 DIABETES MELLITUS WITH OTHER SPECIFIED COMPLICATION, WITH LONG-TERM CURRENT USE OF INSULIN: Primary | ICD-10-CM

## 2025-07-04 DIAGNOSIS — J30.5 RHINITIS DUE FOOD: ICD-10-CM

## 2025-07-07 DIAGNOSIS — J30.5 RHINITIS DUE FOOD: ICD-10-CM

## 2025-07-07 RX ORDER — LEVOCETIRIZINE DIHYDROCHLORIDE 5 MG/1
5 TABLET, FILM COATED ORAL EVERY EVENING
Qty: 30 TABLET | Refills: 1 | Status: SHIPPED | OUTPATIENT
Start: 2025-07-07

## 2025-07-07 RX ORDER — LEVOCETIRIZINE DIHYDROCHLORIDE 5 MG/1
5 TABLET, FILM COATED ORAL EVERY EVENING
Qty: 30 TABLET | Refills: 1 | OUTPATIENT
Start: 2025-07-07

## 2025-08-04 DIAGNOSIS — E11.69 TYPE 2 DIABETES MELLITUS WITH OTHER SPECIFIED COMPLICATION, WITH LONG-TERM CURRENT USE OF INSULIN: ICD-10-CM

## 2025-08-04 DIAGNOSIS — Z79.4 TYPE 2 DIABETES MELLITUS WITH OTHER SPECIFIED COMPLICATION, WITH LONG-TERM CURRENT USE OF INSULIN: ICD-10-CM

## 2025-08-04 RX ORDER — INSULIN GLARGINE-YFGN 100 [IU]/ML
25 INJECTION, SOLUTION SUBCUTANEOUS DAILY
Qty: 15 ML | Refills: 1 | Status: SHIPPED | OUTPATIENT
Start: 2025-08-04

## 2025-08-05 RX ORDER — PEN NEEDLE, DIABETIC 32GX 5/32"
NEEDLE, DISPOSABLE MISCELLANEOUS
Qty: 400 EACH | Refills: 3 | Status: SHIPPED | OUTPATIENT
Start: 2025-08-05

## 2025-08-14 ENCOUNTER — OFFICE VISIT (OUTPATIENT)
Dept: INTERNAL MEDICINE | Facility: CLINIC | Age: 80
End: 2025-08-14
Payer: MEDICARE

## 2025-08-14 VITALS
HEIGHT: 66 IN | DIASTOLIC BLOOD PRESSURE: 68 MMHG | HEART RATE: 83 BPM | OXYGEN SATURATION: 97 % | WEIGHT: 140 LBS | RESPIRATION RATE: 18 BRPM | TEMPERATURE: 97.5 F | BODY MASS INDEX: 22.5 KG/M2 | SYSTOLIC BLOOD PRESSURE: 122 MMHG

## 2025-08-14 DIAGNOSIS — E11.69 TYPE 2 DIABETES MELLITUS WITH OTHER SPECIFIED COMPLICATION, WITH LONG-TERM CURRENT USE OF INSULIN: Primary | ICD-10-CM

## 2025-08-14 DIAGNOSIS — Z79.4 TYPE 2 DIABETES MELLITUS WITH OTHER SPECIFIED COMPLICATION, WITH LONG-TERM CURRENT USE OF INSULIN: Primary | ICD-10-CM

## 2025-08-14 DIAGNOSIS — I10 PRIMARY HYPERTENSION: ICD-10-CM

## 2025-08-14 LAB
EXPIRATION DATE: ABNORMAL
HBA1C MFR BLD: 6.6 % (ref 4.5–5.7)
Lab: ABNORMAL